# Patient Record
Sex: MALE | Race: BLACK OR AFRICAN AMERICAN | Employment: FULL TIME | URBAN - METROPOLITAN AREA
[De-identification: names, ages, dates, MRNs, and addresses within clinical notes are randomized per-mention and may not be internally consistent; named-entity substitution may affect disease eponyms.]

---

## 2018-01-26 PROBLEM — S76.112A RUPTURE OF LEFT QUADRICEPS MUSCLE, INITIAL ENCOUNTER: Status: ACTIVE | Noted: 2018-01-26

## 2018-03-01 PROBLEM — S76.112D RUPTURE OF LEFT QUADRICEPS MUSCLE, SUBSEQUENT ENCOUNTER: Status: ACTIVE | Noted: 2018-03-01

## 2019-05-29 ENCOUNTER — OFFICE VISIT (OUTPATIENT)
Dept: INTERNAL MEDICINE CLINIC | Facility: CLINIC | Age: 54
End: 2019-05-29
Payer: COMMERCIAL

## 2019-05-29 ENCOUNTER — LAB ENCOUNTER (OUTPATIENT)
Dept: LAB | Age: 54
End: 2019-05-29
Attending: INTERNAL MEDICINE
Payer: COMMERCIAL

## 2019-05-29 VITALS
WEIGHT: 315 LBS | SYSTOLIC BLOOD PRESSURE: 140 MMHG | HEART RATE: 76 BPM | OXYGEN SATURATION: 96 % | TEMPERATURE: 98 F | HEIGHT: 73 IN | BODY MASS INDEX: 41.75 KG/M2 | DIASTOLIC BLOOD PRESSURE: 86 MMHG | RESPIRATION RATE: 16 BRPM

## 2019-05-29 DIAGNOSIS — R03.0 ELEVATED BP WITHOUT DIAGNOSIS OF HYPERTENSION: ICD-10-CM

## 2019-05-29 DIAGNOSIS — Z12.9 CANCER SCREENING: ICD-10-CM

## 2019-05-29 DIAGNOSIS — E66.01 OBESITY, CLASS III, BMI 40-49.9 (MORBID OBESITY) (HCC): ICD-10-CM

## 2019-05-29 DIAGNOSIS — E78.00 PURE HYPERCHOLESTEROLEMIA: ICD-10-CM

## 2019-05-29 DIAGNOSIS — N52.9 ERECTILE DYSFUNCTION, UNSPECIFIED ERECTILE DYSFUNCTION TYPE: ICD-10-CM

## 2019-05-29 DIAGNOSIS — G47.30 SLEEP APNEA, UNSPECIFIED TYPE: ICD-10-CM

## 2019-05-29 DIAGNOSIS — Z00.00 PHYSICAL EXAM: Primary | ICD-10-CM

## 2019-05-29 DIAGNOSIS — Z00.00 PHYSICAL EXAM: ICD-10-CM

## 2019-05-29 PROCEDURE — 80061 LIPID PANEL: CPT

## 2019-05-29 PROCEDURE — 84443 ASSAY THYROID STIM HORMONE: CPT

## 2019-05-29 PROCEDURE — 84403 ASSAY OF TOTAL TESTOSTERONE: CPT

## 2019-05-29 PROCEDURE — 84402 ASSAY OF FREE TESTOSTERONE: CPT

## 2019-05-29 PROCEDURE — 36415 COLL VENOUS BLD VENIPUNCTURE: CPT

## 2019-05-29 PROCEDURE — 85025 COMPLETE CBC W/AUTO DIFF WBC: CPT

## 2019-05-29 PROCEDURE — 84439 ASSAY OF FREE THYROXINE: CPT

## 2019-05-29 PROCEDURE — 83036 HEMOGLOBIN GLYCOSYLATED A1C: CPT

## 2019-05-29 PROCEDURE — 80053 COMPREHEN METABOLIC PANEL: CPT

## 2019-05-29 PROCEDURE — 99396 PREV VISIT EST AGE 40-64: CPT | Performed by: INTERNAL MEDICINE

## 2019-05-29 NOTE — PROGRESS NOTES
HPI:    Patient ID: Chuy Rosado is a 48year old male. In general feels well. Had left quadriceps repair 2/18--has no pain, but still with some mobility issues--was unable to return to usual duties as .   Patient has completed No      Sexual activity: Not on file    Lifestyle      Physical activity:        Days per week: Not on file        Minutes per session: Not on file      Stress: Not on file    Relationships      Social connections:        Talks on phone: Not on file Disp:  Rfl:      Allergies:No Known Allergies   PHYSICAL EXAM:   /86   Pulse 76   Temp 98.4 °F (36.9 °C) (Oral)   Resp 16   Ht 6' 1\" (1.854 m)   Wt (!) 330 lb (149.7 kg)   SpO2 96%   BMI 43.54 kg/m²   Physical Exam   Constitutional: He is oriented t will try to be more compliant with diet and will try to increase his exercise. He is status post quadriceps tendon repair on left--I urged him to continue with home exercise program as prescribed by physical therapy.     His blood pressure is also border

## 2019-06-03 ENCOUNTER — TELEPHONE (OUTPATIENT)
Dept: INTERNAL MEDICINE CLINIC | Facility: CLINIC | Age: 54
End: 2019-06-03

## 2019-06-03 DIAGNOSIS — E34.9 TESTOSTERONE DEFICIENCY: Primary | ICD-10-CM

## 2019-06-03 RX ORDER — TESTOSTERONE 16.2 MG/G
2 GEL TRANSDERMAL DAILY
Qty: 75 G | Refills: 3 | Status: SHIPPED | OUTPATIENT
Start: 2019-06-03 | End: 2019-09-30 | Stop reason: ALTCHOICE

## 2019-06-03 NOTE — TELEPHONE ENCOUNTER
Called patient and relayed DR. VYAS message - verbalized understanding.  Copy of lab results mailed to patients home per request

## 2019-06-03 NOTE — TELEPHONE ENCOUNTER
----- Message from Nelson Mirza MD sent at 6/3/2019  9:26 AM CDT -----  Testosterone level is low  Therefore will need to start testosterone replacement with AndroGel pump  20.25   2 actuations daily  We will need to check free testosterone level again

## 2019-06-03 NOTE — TELEPHONE ENCOUNTER
Rx for Testosterone (Androgel Pump) faxed to [address] [phone] with confirmation received.

## 2019-06-10 ENCOUNTER — TELEPHONE (OUTPATIENT)
Dept: INTERNAL MEDICINE CLINIC | Facility: CLINIC | Age: 54
End: 2019-06-10

## 2019-06-10 NOTE — TELEPHONE ENCOUNTER
[facility] faxed a PA for Testosterone Gel   Fax.  # [phone]   Placed in Purple folder   Routed to Rx

## 2019-06-13 NOTE — TELEPHONE ENCOUNTER
Pls call pt to have him contact insurance company to find out what formulation of testosterone is covered;   Then it would go back to  for change

## 2019-06-20 NOTE — TELEPHONE ENCOUNTER
Smiley's message relayed to pt who verbalized understanding. Pt will call insurance company and call us back.

## 2019-09-16 ENCOUNTER — OFFICE VISIT (OUTPATIENT)
Dept: INTERNAL MEDICINE CLINIC | Facility: CLINIC | Age: 54
End: 2019-09-16
Payer: COMMERCIAL

## 2019-09-16 VITALS
OXYGEN SATURATION: 98 % | BODY MASS INDEX: 48 KG/M2 | SYSTOLIC BLOOD PRESSURE: 158 MMHG | TEMPERATURE: 99 F | DIASTOLIC BLOOD PRESSURE: 78 MMHG | WEIGHT: 315 LBS | HEART RATE: 89 BPM

## 2019-09-16 DIAGNOSIS — E29.1 HYPOGONADISM IN MALE: ICD-10-CM

## 2019-09-16 DIAGNOSIS — E66.1 CLASS 3 DRUG-INDUCED OBESITY WITH SERIOUS COMORBIDITY AND BODY MASS INDEX (BMI) OF 45.0 TO 49.9 IN ADULT (HCC): ICD-10-CM

## 2019-09-16 DIAGNOSIS — G47.30 SLEEP APNEA, UNSPECIFIED TYPE: ICD-10-CM

## 2019-09-16 DIAGNOSIS — M79.89 RIGHT LEG SWELLING: Primary | ICD-10-CM

## 2019-09-16 DIAGNOSIS — R03.0 ELEVATED BP WITHOUT DIAGNOSIS OF HYPERTENSION: ICD-10-CM

## 2019-09-16 PROCEDURE — 99214 OFFICE O/P EST MOD 30 MIN: CPT | Performed by: INTERNAL MEDICINE

## 2019-09-16 RX ORDER — FUROSEMIDE 20 MG/1
20 TABLET ORAL DAILY
Qty: 30 TABLET | Refills: 3 | Status: SHIPPED | OUTPATIENT
Start: 2019-09-16 | End: 2020-01-24

## 2019-09-16 NOTE — PROGRESS NOTES
Kandy Cardenas is a 48year old male. HPI:   Patient presents with:  Swelling: Right foot and calf swelling since about 2 weeks ago. Swelling does seem to fluctuate. On light duty at work, driving more.        47 y/o M with ONUR who has been non-compl palpitations  Respiratory:  Negative for cough, dyspnea and wheezing  Gastrointestinal:  Negative for abdominal pain, constipation, decreased appetite, diarrhea and vomiting; no melena or hematochezia  All other review of systems are negative.         PHYSI

## 2019-09-19 ENCOUNTER — HOSPITAL ENCOUNTER (OUTPATIENT)
Dept: ULTRASOUND IMAGING | Age: 54
Discharge: HOME | End: 2019-09-19
Attending: INTERNAL MEDICINE
Payer: COMMERCIAL

## 2019-09-19 DIAGNOSIS — M79.89 RIGHT LEG SWELLING: ICD-10-CM

## 2019-09-19 PROCEDURE — 93971 EXTREMITY STUDY: CPT | Performed by: INTERNAL MEDICINE

## 2019-09-30 ENCOUNTER — OFFICE VISIT (OUTPATIENT)
Dept: INTERNAL MEDICINE CLINIC | Facility: CLINIC | Age: 54
End: 2019-09-30
Payer: COMMERCIAL

## 2019-09-30 VITALS
HEART RATE: 96 BPM | SYSTOLIC BLOOD PRESSURE: 160 MMHG | OXYGEN SATURATION: 90 % | TEMPERATURE: 98 F | WEIGHT: 315 LBS | DIASTOLIC BLOOD PRESSURE: 70 MMHG | BODY MASS INDEX: 47 KG/M2 | RESPIRATION RATE: 22 BRPM

## 2019-09-30 DIAGNOSIS — G47.30 SLEEP APNEA, UNSPECIFIED TYPE: ICD-10-CM

## 2019-09-30 DIAGNOSIS — M79.89 RIGHT LEG SWELLING: Primary | ICD-10-CM

## 2019-09-30 DIAGNOSIS — I10 BENIGN HYPERTENSION: ICD-10-CM

## 2019-09-30 PROCEDURE — 99214 OFFICE O/P EST MOD 30 MIN: CPT | Performed by: INTERNAL MEDICINE

## 2019-09-30 RX ORDER — LISINOPRIL 10 MG/1
10 TABLET ORAL DAILY
Qty: 30 TABLET | Refills: 5 | Status: SHIPPED | OUTPATIENT
Start: 2019-09-30 | End: 2020-04-15

## 2019-09-30 NOTE — PROGRESS NOTES
Bianca Gimenez is a 48year old male. HPI:   Patient presents with: Follow - Up: 2 week follow up for R leg/foot swelling. States pain and swelling has decreased. Inquiring about US results.        47 y/o M here with c/o BLE edema, ONUR here for F/U; constipation, decreased appetite, diarrhea and vomiting; no melena or hematochezia  All other review of systems are negative. PHYSICAL EXAM:   Blood pressure 160/70, pulse 96, temperature 98.3 °F (36.8 °C), temperature source Oral, resp.  rate 22, we

## 2019-10-21 ENCOUNTER — OFFICE VISIT (OUTPATIENT)
Dept: INTERNAL MEDICINE CLINIC | Facility: CLINIC | Age: 54
End: 2019-10-21
Payer: COMMERCIAL

## 2019-10-21 ENCOUNTER — APPOINTMENT (OUTPATIENT)
Dept: LAB | Age: 54
End: 2019-10-21
Attending: INTERNAL MEDICINE
Payer: COMMERCIAL

## 2019-10-21 VITALS
WEIGHT: 315 LBS | TEMPERATURE: 98 F | HEART RATE: 84 BPM | BODY MASS INDEX: 46 KG/M2 | OXYGEN SATURATION: 98 % | RESPIRATION RATE: 16 BRPM | DIASTOLIC BLOOD PRESSURE: 70 MMHG | SYSTOLIC BLOOD PRESSURE: 136 MMHG

## 2019-10-21 DIAGNOSIS — G47.30 SLEEP APNEA, UNSPECIFIED TYPE: ICD-10-CM

## 2019-10-21 DIAGNOSIS — I10 BENIGN HYPERTENSION: ICD-10-CM

## 2019-10-21 DIAGNOSIS — M79.89 SWELLING OF BOTH LOWER EXTREMITIES: Primary | ICD-10-CM

## 2019-10-21 PROCEDURE — 36415 COLL VENOUS BLD VENIPUNCTURE: CPT

## 2019-10-21 PROCEDURE — 99214 OFFICE O/P EST MOD 30 MIN: CPT | Performed by: INTERNAL MEDICINE

## 2019-10-21 PROCEDURE — 80048 BASIC METABOLIC PNL TOTAL CA: CPT

## 2019-10-21 NOTE — PROGRESS NOTES
Kim Leon is a 48year old male. HPI:   Patient presents with: Follow - Up: 3 week follow up. Pt reports swelling has improved since LOV.        47 y/o M with HTN, BLE edema, ONUR here for F/U; he has been taking Lasix 20 mg po qD with decrease wheezing  Gastrointestinal:  Negative for abdominal pain, constipation, decreased appetite, diarrhea and vomiting; no melena or hematochezia  All other review of systems are negative. PHYSICAL EXAM:   Blood pressure 136/70, pulse 84, temperature 98.

## 2019-12-11 ENCOUNTER — OFFICE VISIT (OUTPATIENT)
Dept: INTERNAL MEDICINE CLINIC | Facility: CLINIC | Age: 54
End: 2019-12-11
Payer: COMMERCIAL

## 2019-12-11 VITALS
SYSTOLIC BLOOD PRESSURE: 134 MMHG | HEART RATE: 68 BPM | BODY MASS INDEX: 41.75 KG/M2 | WEIGHT: 315 LBS | OXYGEN SATURATION: 98 % | HEIGHT: 73 IN | DIASTOLIC BLOOD PRESSURE: 84 MMHG

## 2019-12-11 DIAGNOSIS — E66.01 OBESITY, CLASS III, BMI 40-49.9 (MORBID OBESITY) (HCC): ICD-10-CM

## 2019-12-11 DIAGNOSIS — I10 BENIGN ESSENTIAL HTN: Primary | ICD-10-CM

## 2019-12-11 DIAGNOSIS — G47.33 OBSTRUCTIVE SLEEP APNEA (ADULT) (PEDIATRIC): ICD-10-CM

## 2019-12-11 DIAGNOSIS — S76.112D RUPTURE OF LEFT QUADRICEPS MUSCLE, SUBSEQUENT ENCOUNTER: ICD-10-CM

## 2019-12-11 PROCEDURE — 99213 OFFICE O/P EST LOW 20 MIN: CPT | Performed by: INTERNAL MEDICINE

## 2019-12-12 NOTE — PROGRESS NOTES
HPI:    Patient ID: Roslyn Young is a 48year old male. Patient presents for follow-up. He is not always compliant with his medications and frequently misses 1 or 2 doses of each medication per week.     He does have history of left quadriceps t Lymphadenopathy:     He has no cervical adenopathy. Neurological: He is alert and oriented to person, place, and time. He exhibits normal muscle tone. Psychiatric: He has a normal mood and affect.  Thought content normal.              ASSESSMENT/JASMINA

## 2020-01-24 RX ORDER — FUROSEMIDE 20 MG/1
TABLET ORAL
Qty: 30 TABLET | Refills: 3 | Status: SHIPPED | OUTPATIENT
Start: 2020-01-24 | End: 2020-06-03

## 2020-04-15 RX ORDER — LISINOPRIL 10 MG/1
TABLET ORAL
Qty: 30 TABLET | Refills: 2 | Status: SHIPPED | OUTPATIENT
Start: 2020-04-15 | End: 2020-06-03

## 2020-04-15 NOTE — TELEPHONE ENCOUNTER
Patient is scheduled for follow up with Dr. Que Beaulieu on 5/6/20.     Refill request is for a maintenance medication and has met the criteria specified in the Ambulatory Medication Refill Standing Order for eligibility, visits, laboratory, alerts and was sent to

## 2020-06-03 ENCOUNTER — OFFICE VISIT (OUTPATIENT)
Dept: INTERNAL MEDICINE CLINIC | Facility: CLINIC | Age: 55
End: 2020-06-03
Payer: COMMERCIAL

## 2020-06-03 VITALS
DIASTOLIC BLOOD PRESSURE: 90 MMHG | HEIGHT: 73 IN | OXYGEN SATURATION: 97 % | WEIGHT: 315 LBS | HEART RATE: 84 BPM | BODY MASS INDEX: 41.75 KG/M2 | TEMPERATURE: 98 F | SYSTOLIC BLOOD PRESSURE: 148 MMHG

## 2020-06-03 DIAGNOSIS — Z00.00 PHYSICAL EXAM: ICD-10-CM

## 2020-06-03 DIAGNOSIS — E66.01 OBESITY, CLASS III, BMI 40-49.9 (MORBID OBESITY) (HCC): ICD-10-CM

## 2020-06-03 DIAGNOSIS — R22.2 NODULE OF LEFT ANTERIOR CHEST WALL: ICD-10-CM

## 2020-06-03 DIAGNOSIS — E78.00 PURE HYPERCHOLESTEROLEMIA: ICD-10-CM

## 2020-06-03 DIAGNOSIS — I10 BENIGN ESSENTIAL HTN: Primary | ICD-10-CM

## 2020-06-03 DIAGNOSIS — R79.89 LOW TESTOSTERONE: ICD-10-CM

## 2020-06-03 DIAGNOSIS — G47.33 OBSTRUCTIVE SLEEP APNEA (ADULT) (PEDIATRIC): ICD-10-CM

## 2020-06-03 PROCEDURE — 99396 PREV VISIT EST AGE 40-64: CPT | Performed by: INTERNAL MEDICINE

## 2020-06-03 RX ORDER — FUROSEMIDE 20 MG/1
20 TABLET ORAL DAILY
Qty: 90 TABLET | Refills: 3 | Status: SHIPPED | OUTPATIENT
Start: 2020-06-03 | End: 2020-06-12

## 2020-06-03 RX ORDER — LISINOPRIL 10 MG/1
10 TABLET ORAL DAILY
Qty: 90 TABLET | Refills: 3 | Status: SHIPPED | OUTPATIENT
Start: 2020-06-03 | End: 2020-06-12

## 2020-06-03 NOTE — PROGRESS NOTES
HPI:    Patient ID: Georgiana Menchaca is a 48year old male. Pt did not take his blood pressure meds for the past 2 wks. States that prescription ran out and he has not yet made it back to fill it.     Notes nodule left chest area started around 2 week congestion, ear pain, postnasal drip, sore throat and trouble swallowing. Eyes: Negative for visual disturbance. Respiratory: Negative for cough, chest tightness, shortness of breath and wheezing.     Cardiovascular: Negative for chest pain, palpitatio normal and breath sounds normal. No respiratory distress. He has no wheezes. He has no rales.    There is a very small approximately pea-sized lesion palpated left anterior chest--it appears superficial and not affixed to any underlying tissue--it is not te left anterior chest wall that patient is noticeably noting decreasing in size. He has no symptoms including pain from it. It started after doing extensive upper extremity work. ? Hematoma, ? Cyst, doubt tumor, but will continue to monitor. --If patient

## 2020-06-12 RX ORDER — LISINOPRIL 10 MG/1
10 TABLET ORAL DAILY
Qty: 90 TABLET | Refills: 3 | Status: SHIPPED | OUTPATIENT
Start: 2020-06-12 | End: 2021-09-07

## 2020-06-12 RX ORDER — FUROSEMIDE 20 MG/1
20 TABLET ORAL DAILY
Qty: 90 TABLET | Refills: 3 | Status: SHIPPED | OUTPATIENT
Start: 2020-06-12

## 2020-06-12 NOTE — TELEPHONE ENCOUNTER
Recently refilled to [facility] in [address] [place] [address] states he prefers [facility].  Rx resent    Refill request is for a maintenance medication and has met the criteria specified in the Ambulatory Medication Refill Standing Order for eligibility, visits, laboratory, al

## 2020-07-24 ENCOUNTER — LAB ENCOUNTER (OUTPATIENT)
Dept: LAB | Age: 55
End: 2020-07-24
Attending: INTERNAL MEDICINE
Payer: COMMERCIAL

## 2020-07-24 DIAGNOSIS — R79.89 LOW TESTOSTERONE: ICD-10-CM

## 2020-07-24 DIAGNOSIS — Z00.00 PHYSICAL EXAM: ICD-10-CM

## 2020-07-24 LAB
ALBUMIN SERPL-MCNC: 3.9 G/DL (ref 3.4–5)
ALBUMIN/GLOB SERPL: 1.1 {RATIO} (ref 1–2)
ALP LIVER SERPL-CCNC: 76 U/L (ref 45–117)
ALT SERPL-CCNC: 32 U/L (ref 16–61)
ANION GAP SERPL CALC-SCNC: 7 MMOL/L (ref 0–18)
AST SERPL-CCNC: 18 U/L (ref 15–37)
BASOPHILS # BLD AUTO: 0.03 X10(3) UL (ref 0–0.2)
BASOPHILS NFR BLD AUTO: 0.5 %
BILIRUB SERPL-MCNC: 0.8 MG/DL (ref 0.1–2)
BUN BLD-MCNC: 20 MG/DL (ref 7–18)
BUN/CREAT SERPL: 21.1 (ref 10–20)
CALCIUM BLD-MCNC: 9.1 MG/DL (ref 8.5–10.1)
CHLORIDE SERPL-SCNC: 106 MMOL/L (ref 98–112)
CHOLEST SMN-MCNC: 184 MG/DL (ref ?–200)
CO2 SERPL-SCNC: 27 MMOL/L (ref 21–32)
COMPLEXED PSA SERPL-MCNC: 0.18 NG/ML (ref ?–4)
CREAT BLD-MCNC: 0.95 MG/DL (ref 0.7–1.3)
DEPRECATED RDW RBC AUTO: 37.7 FL (ref 35.1–46.3)
EOSINOPHIL # BLD AUTO: 0.27 X10(3) UL (ref 0–0.7)
EOSINOPHIL NFR BLD AUTO: 4.2 %
ERYTHROCYTE [DISTWIDTH] IN BLOOD BY AUTOMATED COUNT: 11.9 % (ref 11–15)
EST. AVERAGE GLUCOSE BLD GHB EST-MCNC: 103 MG/DL (ref 68–126)
GLOBULIN PLAS-MCNC: 3.6 G/DL (ref 2.8–4.4)
GLUCOSE BLD-MCNC: 87 MG/DL (ref 70–99)
HBA1C MFR BLD HPLC: 5.2 % (ref ?–5.7)
HCT VFR BLD AUTO: 43.2 % (ref 39–53)
HDLC SERPL-MCNC: 43 MG/DL (ref 40–59)
HGB BLD-MCNC: 14 G/DL (ref 13–17.5)
IMM GRANULOCYTES # BLD AUTO: 0.02 X10(3) UL (ref 0–1)
IMM GRANULOCYTES NFR BLD: 0.3 %
LDLC SERPL CALC-MCNC: 126 MG/DL (ref ?–100)
LYMPHOCYTES # BLD AUTO: 2.96 X10(3) UL (ref 1–4)
LYMPHOCYTES NFR BLD AUTO: 46.5 %
M PROTEIN MFR SERPL ELPH: 7.5 G/DL (ref 6.4–8.2)
MCH RBC QN AUTO: 28.2 PG (ref 26–34)
MCHC RBC AUTO-ENTMCNC: 32.4 G/DL (ref 31–37)
MCV RBC AUTO: 87.1 FL (ref 80–100)
MONOCYTES # BLD AUTO: 0.47 X10(3) UL (ref 0.1–1)
MONOCYTES NFR BLD AUTO: 7.4 %
NEUTROPHILS # BLD AUTO: 2.62 X10 (3) UL (ref 1.5–7.7)
NEUTROPHILS # BLD AUTO: 2.62 X10(3) UL (ref 1.5–7.7)
NEUTROPHILS NFR BLD AUTO: 41.1 %
NONHDLC SERPL-MCNC: 141 MG/DL (ref ?–130)
OSMOLALITY SERPL CALC.SUM OF ELEC: 292 MOSM/KG (ref 275–295)
PATIENT FASTING Y/N/NP: YES
PATIENT FASTING Y/N/NP: YES
PLATELET # BLD AUTO: 294 10(3)UL (ref 150–450)
POTASSIUM SERPL-SCNC: 4.1 MMOL/L (ref 3.5–5.1)
RBC # BLD AUTO: 4.96 X10(6)UL (ref 4.3–5.7)
SODIUM SERPL-SCNC: 140 MMOL/L (ref 136–145)
TRIGL SERPL-MCNC: 77 MG/DL (ref 30–149)
TSI SER-ACNC: 3.32 MIU/ML (ref 0.36–3.74)
VLDLC SERPL CALC-MCNC: 15 MG/DL (ref 0–30)
WBC # BLD AUTO: 6.4 X10(3) UL (ref 4–11)

## 2020-07-24 PROCEDURE — 83036 HEMOGLOBIN GLYCOSYLATED A1C: CPT

## 2020-07-24 PROCEDURE — 85025 COMPLETE CBC W/AUTO DIFF WBC: CPT

## 2020-07-24 PROCEDURE — 80061 LIPID PANEL: CPT

## 2020-07-24 PROCEDURE — 84403 ASSAY OF TOTAL TESTOSTERONE: CPT

## 2020-07-24 PROCEDURE — 80053 COMPREHEN METABOLIC PANEL: CPT

## 2020-07-24 PROCEDURE — 84402 ASSAY OF FREE TESTOSTERONE: CPT

## 2020-07-24 PROCEDURE — 84443 ASSAY THYROID STIM HORMONE: CPT

## 2020-07-24 PROCEDURE — 36415 COLL VENOUS BLD VENIPUNCTURE: CPT

## 2020-07-27 LAB
TESTOSTERONE, FREE, S: 4.69 NG/DL
TESTOSTERONE, TOTAL, S: 213 NG/DL

## 2020-07-28 ENCOUNTER — TELEPHONE (OUTPATIENT)
Dept: INTERNAL MEDICINE CLINIC | Facility: CLINIC | Age: 55
End: 2020-07-28

## 2020-07-28 NOTE — TELEPHONE ENCOUNTER
Call transferred from Crystal Johnson at [facility]. Spoke to patient and relayed MD message, patient verbalized understanding.

## 2020-07-28 NOTE — TELEPHONE ENCOUNTER
----- Message from Kavin Barton MD sent at 7/26/2020  7:39 AM CDT -----  Hemoglobin A1c is normal at 5.2--therefore there is no evidence for diabetes. Cholesterol is 184 with LDL of 126--this is at goal LDL of less than 130.   CMP, including electrolyt

## 2020-07-28 NOTE — TELEPHONE ENCOUNTER
----- Message from Maryanne Holman MD sent at 7/28/2020  8:48 AM CDT -----  Free testosterone level is normal.  Therefore no testosterone supplementation is needed at this time.

## 2021-03-21 RX ORDER — FUROSEMIDE 20 MG/1
TABLET ORAL
Qty: 90 TABLET | Refills: 3 | OUTPATIENT
Start: 2021-03-21

## 2021-03-21 RX ORDER — LISINOPRIL 10 MG/1
TABLET ORAL
Qty: 90 TABLET | Refills: 3 | OUTPATIENT
Start: 2021-03-21

## 2021-04-05 RX ORDER — LISINOPRIL 10 MG/1
TABLET ORAL
Qty: 90 TABLET | Refills: 3 | OUTPATIENT
Start: 2021-04-05

## 2021-04-05 RX ORDER — FUROSEMIDE 20 MG/1
TABLET ORAL
Qty: 90 TABLET | Refills: 3 | OUTPATIENT
Start: 2021-04-05

## 2021-09-07 ENCOUNTER — LAB ENCOUNTER (OUTPATIENT)
Dept: LAB | Age: 56
End: 2021-09-07
Attending: INTERNAL MEDICINE
Payer: COMMERCIAL

## 2021-09-07 ENCOUNTER — OFFICE VISIT (OUTPATIENT)
Dept: INTERNAL MEDICINE CLINIC | Facility: CLINIC | Age: 56
End: 2021-09-07
Payer: COMMERCIAL

## 2021-09-07 VITALS
OXYGEN SATURATION: 94 % | HEIGHT: 72.5 IN | TEMPERATURE: 99 F | DIASTOLIC BLOOD PRESSURE: 90 MMHG | SYSTOLIC BLOOD PRESSURE: 140 MMHG | BODY MASS INDEX: 42.2 KG/M2 | WEIGHT: 315 LBS | HEART RATE: 75 BPM

## 2021-09-07 DIAGNOSIS — Z00.00 PHYSICAL EXAM: Primary | ICD-10-CM

## 2021-09-07 DIAGNOSIS — R79.89 LOW TESTOSTERONE: ICD-10-CM

## 2021-09-07 DIAGNOSIS — I10 BENIGN ESSENTIAL HTN: ICD-10-CM

## 2021-09-07 DIAGNOSIS — Z00.00 PHYSICAL EXAM: ICD-10-CM

## 2021-09-07 DIAGNOSIS — L60.0 INGROWN TOENAIL OF LEFT FOOT: ICD-10-CM

## 2021-09-07 DIAGNOSIS — E66.01 OBESITY, CLASS III, BMI 40-49.9 (MORBID OBESITY) (HCC): ICD-10-CM

## 2021-09-07 DIAGNOSIS — E78.00 PURE HYPERCHOLESTEROLEMIA: ICD-10-CM

## 2021-09-07 DIAGNOSIS — G47.30 SLEEP APNEA, UNSPECIFIED TYPE: ICD-10-CM

## 2021-09-07 LAB
ALBUMIN SERPL-MCNC: 4.1 G/DL (ref 3.4–5)
ALBUMIN/GLOB SERPL: 1.2 {RATIO} (ref 1–2)
ALP LIVER SERPL-CCNC: 70 U/L
ALT SERPL-CCNC: 33 U/L
ANION GAP SERPL CALC-SCNC: 10 MMOL/L (ref 0–18)
AST SERPL-CCNC: 17 U/L (ref 15–37)
BASOPHILS # BLD AUTO: 0.03 X10(3) UL (ref 0–0.2)
BASOPHILS NFR BLD AUTO: 0.5 %
BILIRUB SERPL-MCNC: 1.3 MG/DL (ref 0.1–2)
BUN BLD-MCNC: 17 MG/DL (ref 7–18)
BUN/CREAT SERPL: 19.8 (ref 10–20)
CALCIUM BLD-MCNC: 9.6 MG/DL (ref 8.5–10.1)
CHLORIDE SERPL-SCNC: 106 MMOL/L (ref 98–112)
CHOLEST SMN-MCNC: 213 MG/DL (ref ?–200)
CO2 SERPL-SCNC: 24 MMOL/L (ref 21–32)
COMPLEXED PSA SERPL-MCNC: 0.26 NG/ML (ref ?–4)
CREAT BLD-MCNC: 0.86 MG/DL
DEPRECATED RDW RBC AUTO: 37.8 FL (ref 35.1–46.3)
EOSINOPHIL # BLD AUTO: 0.15 X10(3) UL (ref 0–0.7)
EOSINOPHIL NFR BLD AUTO: 2.5 %
ERYTHROCYTE [DISTWIDTH] IN BLOOD BY AUTOMATED COUNT: 11.9 % (ref 11–15)
EST. AVERAGE GLUCOSE BLD GHB EST-MCNC: 97 MG/DL (ref 68–126)
GLOBULIN PLAS-MCNC: 3.4 G/DL (ref 2.8–4.4)
GLUCOSE BLD-MCNC: 81 MG/DL (ref 70–99)
HBA1C MFR BLD HPLC: 5 % (ref ?–5.7)
HCT VFR BLD AUTO: 44.1 %
HDLC SERPL-MCNC: 47 MG/DL (ref 40–59)
HGB BLD-MCNC: 14.3 G/DL
IMM GRANULOCYTES # BLD AUTO: 0.01 X10(3) UL (ref 0–1)
IMM GRANULOCYTES NFR BLD: 0.2 %
LDLC SERPL CALC-MCNC: 150 MG/DL (ref ?–100)
LYMPHOCYTES # BLD AUTO: 2.48 X10(3) UL (ref 1–4)
LYMPHOCYTES NFR BLD AUTO: 41.2 %
M PROTEIN MFR SERPL ELPH: 7.5 G/DL (ref 6.4–8.2)
MCH RBC QN AUTO: 28 PG (ref 26–34)
MCHC RBC AUTO-ENTMCNC: 32.4 G/DL (ref 31–37)
MCV RBC AUTO: 86.5 FL
MONOCYTES # BLD AUTO: 0.42 X10(3) UL (ref 0.1–1)
MONOCYTES NFR BLD AUTO: 7 %
NEUTROPHILS # BLD AUTO: 2.93 X10 (3) UL (ref 1.5–7.7)
NEUTROPHILS # BLD AUTO: 2.93 X10(3) UL (ref 1.5–7.7)
NEUTROPHILS NFR BLD AUTO: 48.6 %
NONHDLC SERPL-MCNC: 166 MG/DL (ref ?–130)
OSMOLALITY SERPL CALC.SUM OF ELEC: 291 MOSM/KG (ref 275–295)
PATIENT FASTING Y/N/NP: YES
PATIENT FASTING Y/N/NP: YES
PLATELET # BLD AUTO: 291 10(3)UL (ref 150–450)
POTASSIUM SERPL-SCNC: 3.7 MMOL/L (ref 3.5–5.1)
RBC # BLD AUTO: 5.1 X10(6)UL
SODIUM SERPL-SCNC: 140 MMOL/L (ref 136–145)
TRIGL SERPL-MCNC: 91 MG/DL (ref 30–149)
VLDLC SERPL CALC-MCNC: 17 MG/DL (ref 0–30)
WBC # BLD AUTO: 6 X10(3) UL (ref 4–11)

## 2021-09-07 PROCEDURE — 3008F BODY MASS INDEX DOCD: CPT | Performed by: INTERNAL MEDICINE

## 2021-09-07 PROCEDURE — 36415 COLL VENOUS BLD VENIPUNCTURE: CPT

## 2021-09-07 PROCEDURE — 99396 PREV VISIT EST AGE 40-64: CPT | Performed by: INTERNAL MEDICINE

## 2021-09-07 PROCEDURE — 84402 ASSAY OF FREE TESTOSTERONE: CPT

## 2021-09-07 PROCEDURE — 83036 HEMOGLOBIN GLYCOSYLATED A1C: CPT

## 2021-09-07 PROCEDURE — 80061 LIPID PANEL: CPT

## 2021-09-07 PROCEDURE — 85025 COMPLETE CBC W/AUTO DIFF WBC: CPT

## 2021-09-07 PROCEDURE — 3080F DIAST BP >= 90 MM HG: CPT | Performed by: INTERNAL MEDICINE

## 2021-09-07 PROCEDURE — 3077F SYST BP >= 140 MM HG: CPT | Performed by: INTERNAL MEDICINE

## 2021-09-07 PROCEDURE — 84403 ASSAY OF TOTAL TESTOSTERONE: CPT

## 2021-09-07 PROCEDURE — 80053 COMPREHEN METABOLIC PANEL: CPT

## 2021-09-07 RX ORDER — LISINOPRIL 10 MG/1
10 TABLET ORAL DAILY
Qty: 90 TABLET | Refills: 3 | Status: SHIPPED | OUTPATIENT
Start: 2021-09-07

## 2021-09-07 RX ORDER — FUROSEMIDE 20 MG/1
20 TABLET ORAL DAILY
Qty: 90 TABLET | Refills: 3 | Status: CANCELLED | OUTPATIENT
Start: 2021-09-07

## 2021-09-07 NOTE — PROGRESS NOTES
HPI:    Patient ID: Clarita Neumann is a 49year old male. Presents for physical exam    Patient has not taken his lisinopril or furosemide for over 2 months.     He states that he has been trying to work on weight loss by exercising more with a perso stool, diarrhea and vomiting. Endocrine: Negative for cold intolerance and heat intolerance. Genitourinary: Negative for dysuria, hematuria, penile swelling and scrotal swelling. Musculoskeletal: Negative for back pain.    Allergic/Immunologic: Alexander Crawford Normal breath sounds. No wheezing or rales. Abdominal:      General: There is no distension. Palpations: Abdomen is soft. There is no mass. Tenderness: There is no abdominal tenderness. There is no right CVA tenderness or left CVA tenderness. profile today. Patient also has history of low testosterone. He has not been on testosterone supplementation because his insurance never approved testosterone replacement.     Patient has left ingrown toenail--we will refer to podiatry for further manag

## 2021-09-10 ENCOUNTER — TELEPHONE (OUTPATIENT)
Dept: INTERNAL MEDICINE CLINIC | Facility: CLINIC | Age: 56
End: 2021-09-10

## 2021-09-10 DIAGNOSIS — E78.5 HYPERLIPIDEMIA, UNSPECIFIED HYPERLIPIDEMIA TYPE: Primary | ICD-10-CM

## 2021-09-10 RX ORDER — ATORVASTATIN CALCIUM 10 MG/1
10 TABLET, FILM COATED ORAL NIGHTLY
Qty: 90 TABLET | Refills: 3 | Status: SHIPPED | OUTPATIENT
Start: 2021-09-10

## 2021-09-10 NOTE — TELEPHONE ENCOUNTER
Spoke to patient and relayed MD message. Reviewed foods to avoid in order to  improve nutritional intake. Pt verbalized understanding and agrees with plan.

## 2021-09-10 NOTE — TELEPHONE ENCOUNTER
----- Message from Yonatan Miller MD sent at 9/10/2021  8:01 AM CDT -----  General chemistries including glucose, electrolytes, liver function test and kidney function are all normal.    Cholesterol is 213 with LDL of 150--this is too high as goal LDL is

## 2021-09-12 LAB
TESTOSTERONE, FREE, S: 8.56 NG/DL
TESTOSTERONE, TOTAL, S: 317 NG/DL

## 2021-09-13 ENCOUNTER — TELEPHONE (OUTPATIENT)
Dept: INTERNAL MEDICINE CLINIC | Facility: CLINIC | Age: 56
End: 2021-09-13

## 2021-09-13 NOTE — TELEPHONE ENCOUNTER
Lucas Smart MD   9/13/2021  8:47 AM CDT Back to Top        Free testosterone level is normal.  Therefore no testosterone supplementation is needed.     Lucas Smart MD   9/10/2021  8:01 AM CDT         General chemistries including glucose, electro

## 2021-10-19 NOTE — H&P
[address] [facility] [address] Gastroenterology                                                                                                  Clinic History and Physical     Pa Jose - Quad Repair      Family Hx:   Family History   Problem Relation Age of Onset   • High Blood Pressure Mother    • High Blood Pressure Father       Social History: Social History    Tobacco Use      Smoking status: Never Smoker      Smokeless tobac regular rate and rhythm, the extremities are warm and well perfused   Lung: effort normal and breath sounds normal, no respiratory distress, wheezes or rales  GI: soft, non-tender exam in all quadrants without rigidity or guarding, non-distended, no abnorm loss medications (phentermine/Vyvanse/Adderall)  x 7 days prior to the procedure(s)    ** If MAC @ [facility] or IV twilight - continue all medications as prescribed    ** COVID-19 testing required 72 hours prior to procedure      Colonoscopy consent: I have disc

## 2021-10-28 ENCOUNTER — OFFICE VISIT (OUTPATIENT)
Dept: GASTROENTEROLOGY | Facility: CLINIC | Age: 56
End: 2021-10-28
Payer: COMMERCIAL

## 2021-10-28 ENCOUNTER — TELEPHONE (OUTPATIENT)
Dept: GASTROENTEROLOGY | Facility: CLINIC | Age: 56
End: 2021-10-28

## 2021-10-28 VITALS
DIASTOLIC BLOOD PRESSURE: 80 MMHG | HEART RATE: 71 BPM | BODY MASS INDEX: 42.66 KG/M2 | WEIGHT: 315 LBS | HEIGHT: 72 IN | SYSTOLIC BLOOD PRESSURE: 140 MMHG

## 2021-10-28 DIAGNOSIS — Z12.11 COLON CANCER SCREENING: Primary | ICD-10-CM

## 2021-10-28 DIAGNOSIS — Z12.11 SCREENING FOR COLON CANCER: Primary | ICD-10-CM

## 2021-10-28 PROCEDURE — 99243 OFF/OP CNSLTJ NEW/EST LOW 30: CPT | Performed by: NURSE PRACTITIONER

## 2021-10-28 PROCEDURE — 3077F SYST BP >= 140 MM HG: CPT | Performed by: NURSE PRACTITIONER

## 2021-10-28 PROCEDURE — 3079F DIAST BP 80-89 MM HG: CPT | Performed by: NURSE PRACTITIONER

## 2021-10-28 PROCEDURE — 3008F BODY MASS INDEX DOCD: CPT | Performed by: NURSE PRACTITIONER

## 2021-10-28 RX ORDER — POLYETHYLENE GLYCOL 3350, SODIUM CHLORIDE, SODIUM BICARBONATE, POTASSIUM CHLORIDE 420; 11.2; 5.72; 1.48 G/4L; G/4L; G/4L; G/4L
POWDER, FOR SOLUTION ORAL
Qty: 4000 ML | Refills: 0 | Status: SHIPPED | OUTPATIENT
Start: 2021-10-28

## 2021-10-28 NOTE — PATIENT INSTRUCTIONS
-Schedule colonoscopy w/ Dr. Lisa Junior or Dr. Elle Mortensen with MAC due to medical history  Dx: screening   -Eligible for NE: No r/t medical history  -Prep: Split dose Colyte/TriLyte or equivalent  -Anti-platelets and anti-coagulants: None  -Diabetes meds: Non

## 2021-10-28 NOTE — TELEPHONE ENCOUNTER
Scheduled for:  Colonoscopy 15647  Provider Name:  Dr Shaun Ibarra  Date:  03/01/2022  Location:  [facility]  Sedation:  MAC  Time: 10:00am (pt is aware to arrive at 0900)      Prep:  colyte  Meds/Allergies Reconciled?:  Audrey/APN reviewed.   Diagnosis with codes

## 2022-02-23 ENCOUNTER — TELEPHONE (OUTPATIENT)
Dept: GASTROENTEROLOGY | Facility: CLINIC | Age: 57
End: 2022-02-23

## 2022-05-23 ENCOUNTER — MED REC SCAN ONLY (OUTPATIENT)
Dept: INTERNAL MEDICINE CLINIC | Facility: CLINIC | Age: 57
End: 2022-05-23

## 2022-06-16 ENCOUNTER — MED REC SCAN ONLY (OUTPATIENT)
Dept: INTERNAL MEDICINE CLINIC | Facility: CLINIC | Age: 57
End: 2022-06-16

## 2022-06-20 ENCOUNTER — TELEPHONE (OUTPATIENT)
Dept: INTERNAL MEDICINE CLINIC | Facility: CLINIC | Age: 57
End: 2022-06-20

## 2022-06-20 NOTE — TELEPHONE ENCOUNTER
Dr. Valencia Johns reviewed and signed orders from [place] [address] physical therapy. Faxed back to [phone]. Fax confirmation received. Copy to scanning.

## 2022-06-21 ENCOUNTER — TELEPHONE (OUTPATIENT)
Dept: INTERNAL MEDICINE CLINIC | Facility: CLINIC | Age: 57
End: 2022-06-21

## 2022-06-21 NOTE — TELEPHONE ENCOUNTER
Called patient who was not seen since September 2021 - transferred to [facility] to schedule cliff with  regarding note he needs for work

## 2022-06-21 NOTE — TELEPHONE ENCOUNTER
Requests call back to advise on note needed for employer to go over work restrictions due to knee injury      [phone]

## 2022-06-23 ENCOUNTER — LAB ENCOUNTER (OUTPATIENT)
Dept: LAB | Age: 57
End: 2022-06-23
Attending: INTERNAL MEDICINE
Payer: COMMERCIAL

## 2022-06-23 ENCOUNTER — OFFICE VISIT (OUTPATIENT)
Dept: INTERNAL MEDICINE CLINIC | Facility: CLINIC | Age: 57
End: 2022-06-23
Payer: COMMERCIAL

## 2022-06-23 VITALS
DIASTOLIC BLOOD PRESSURE: 70 MMHG | OXYGEN SATURATION: 97 % | BODY MASS INDEX: 42.66 KG/M2 | HEIGHT: 72 IN | RESPIRATION RATE: 16 BRPM | HEART RATE: 78 BPM | TEMPERATURE: 97 F | SYSTOLIC BLOOD PRESSURE: 136 MMHG | WEIGHT: 315 LBS

## 2022-06-23 DIAGNOSIS — I10 BENIGN ESSENTIAL HTN: Primary | ICD-10-CM

## 2022-06-23 DIAGNOSIS — E78.00 PURE HYPERCHOLESTEROLEMIA: ICD-10-CM

## 2022-06-23 DIAGNOSIS — E78.5 HYPERLIPIDEMIA, UNSPECIFIED HYPERLIPIDEMIA TYPE: ICD-10-CM

## 2022-06-23 DIAGNOSIS — G47.30 SLEEP APNEA, UNSPECIFIED TYPE: ICD-10-CM

## 2022-06-23 DIAGNOSIS — M62.89 QUADRICEP TIGHTNESS: ICD-10-CM

## 2022-06-23 LAB
ALBUMIN SERPL-MCNC: 3.7 G/DL (ref 3.4–5)
ALP LIVER SERPL-CCNC: 66 U/L
ALT SERPL-CCNC: 34 U/L
AST SERPL-CCNC: 21 U/L (ref 15–37)
BILIRUB DIRECT SERPL-MCNC: 0.2 MG/DL (ref 0–0.2)
BILIRUB SERPL-MCNC: 0.9 MG/DL (ref 0.1–2)
CHOLEST SERPL-MCNC: 150 MG/DL (ref ?–200)
FASTING PATIENT LIPID ANSWER: NO
HDLC SERPL-MCNC: 47 MG/DL (ref 40–59)
LDLC SERPL CALC-MCNC: 89 MG/DL (ref ?–100)
NONHDLC SERPL-MCNC: 103 MG/DL (ref ?–130)
PROT SERPL-MCNC: 7.1 G/DL (ref 6.4–8.2)
TRIGL SERPL-MCNC: 69 MG/DL (ref 30–149)
VLDLC SERPL CALC-MCNC: 11 MG/DL (ref 0–30)

## 2022-06-23 PROCEDURE — 3008F BODY MASS INDEX DOCD: CPT | Performed by: INTERNAL MEDICINE

## 2022-06-23 PROCEDURE — 36415 COLL VENOUS BLD VENIPUNCTURE: CPT

## 2022-06-23 PROCEDURE — 3075F SYST BP GE 130 - 139MM HG: CPT | Performed by: INTERNAL MEDICINE

## 2022-06-23 PROCEDURE — 80061 LIPID PANEL: CPT

## 2022-06-23 PROCEDURE — 80076 HEPATIC FUNCTION PANEL: CPT

## 2022-06-23 PROCEDURE — 3078F DIAST BP <80 MM HG: CPT | Performed by: INTERNAL MEDICINE

## 2022-06-23 PROCEDURE — 99213 OFFICE O/P EST LOW 20 MIN: CPT | Performed by: INTERNAL MEDICINE

## 2022-06-24 ENCOUNTER — TELEPHONE (OUTPATIENT)
Dept: INTERNAL MEDICINE CLINIC | Facility: CLINIC | Age: 57
End: 2022-06-24

## 2022-06-24 NOTE — TELEPHONE ENCOUNTER
----- Message from Ori Gil MD sent at 6/24/2022  7:47 AM CDT -----  Lab results reviewed. Cholesterol is much better at 150 with LDL of 89--this is now at goal LDL of less than 130. Liver function tests are normal.    Therefore continue same atorvastatin.

## 2022-07-18 ENCOUNTER — TELEPHONE (OUTPATIENT)
Dept: GASTROENTEROLOGY | Facility: CLINIC | Age: 57
End: 2022-07-18

## 2022-07-18 RX ORDER — POLYETHYLENE GLYCOL 3350, SODIUM CHLORIDE, SODIUM BICARBONATE, POTASSIUM CHLORIDE 420; 11.2; 5.72; 1.48 G/4L; G/4L; G/4L; G/4L
POWDER, FOR SOLUTION ORAL
Qty: 4000 ML | Refills: 0 | Status: SHIPPED | OUTPATIENT
Start: 2022-07-18

## 2022-07-18 NOTE — TELEPHONE ENCOUNTER
PEG prep was sent to patient's pharmacy on 10/28/21. Patient just needed to call pharmacy and ask them to fill it. I called the pharmacy myself to request they do so, but was placed on an extended hold. Given the timeframe (procedure tomorrow and patient needs to start prep) I re-sent the script to patient's pharmacy at this time. Left detailed VM for patient informing him to  rx ASAP after touching base w/ pharmacy, as the script was (re)sent & should be ready shortly.

## 2022-07-19 ENCOUNTER — TELEPHONE (OUTPATIENT)
Dept: GASTROENTEROLOGY | Facility: CLINIC | Age: 57
End: 2022-07-19

## 2022-07-19 DIAGNOSIS — Z12.11 SCREEN FOR COLON CANCER: Primary | ICD-10-CM

## 2022-07-20 ENCOUNTER — OFFICE VISIT (OUTPATIENT)
Dept: INTERNAL MEDICINE CLINIC | Facility: CLINIC | Age: 57
End: 2022-07-20
Payer: COMMERCIAL

## 2022-07-20 VITALS
HEART RATE: 70 BPM | TEMPERATURE: 98 F | OXYGEN SATURATION: 96 % | HEIGHT: 72 IN | SYSTOLIC BLOOD PRESSURE: 142 MMHG | WEIGHT: 315 LBS | BODY MASS INDEX: 42.66 KG/M2 | DIASTOLIC BLOOD PRESSURE: 86 MMHG

## 2022-07-20 DIAGNOSIS — I10 BENIGN ESSENTIAL HTN: ICD-10-CM

## 2022-07-20 DIAGNOSIS — S76.112D RUPTURE OF LEFT QUADRICEPS TENDON, SUBSEQUENT ENCOUNTER: ICD-10-CM

## 2022-07-20 DIAGNOSIS — G47.30 SLEEP APNEA, UNSPECIFIED TYPE: ICD-10-CM

## 2022-07-20 DIAGNOSIS — E78.00 PURE HYPERCHOLESTEROLEMIA: ICD-10-CM

## 2022-07-20 DIAGNOSIS — L90.5 SCAR IRRITATION: Primary | ICD-10-CM

## 2022-07-20 PROCEDURE — 3079F DIAST BP 80-89 MM HG: CPT | Performed by: INTERNAL MEDICINE

## 2022-07-20 PROCEDURE — 3008F BODY MASS INDEX DOCD: CPT | Performed by: INTERNAL MEDICINE

## 2022-07-20 PROCEDURE — 99213 OFFICE O/P EST LOW 20 MIN: CPT | Performed by: INTERNAL MEDICINE

## 2022-07-20 PROCEDURE — 3077F SYST BP >= 140 MM HG: CPT | Performed by: INTERNAL MEDICINE

## 2022-07-21 NOTE — TELEPHONE ENCOUNTER
Scheduled for:  Colonoscopy 82800  Provider Name:  Dr Gris Sena  Date:  01/04/2022  Location:   [facility]  Sedation:  MAC  Time:  0632 (pt is aware to arrive at 0845)  Prep: Colyte    Meds/Allergies Reconciled?:  Physician reviewed  Diagnosis with codes:  CCS Z12.11  Was patient informed to call insurance with codes (Y/N):  Y     Referral sent?:  NA  [facility] or [address] notified?:  I sent an electronic request to Endo Scheduling and received a confirmation today. Medication Orders:  Pt is aware to NOT take iron pills, herbal meds and diet supplements for 7 days before exam. Also to NOT take any form of alcohol, recreational drugs and any forms of ED meds 24 hours before exam. Hold lisinopril morning of procedure. Misc Orders:       Further instructions given by staff: I discussed the prep instructions with the patient which HE verbally understood and is aware that I will send the instructions today via [employer].  Patient was informed about Covid testing prior procedure

## 2022-08-24 ENCOUNTER — TELEPHONE (OUTPATIENT)
Dept: INTERNAL MEDICINE CLINIC | Facility: CLINIC | Age: 57
End: 2022-08-24

## 2022-08-24 NOTE — TELEPHONE ENCOUNTER
Spoke to patient, pt requests and agrees for us to fax OV notes from 6/23/22 and 7/20/22, where PT was advised to be continued. Patient confirms recipient is Jose Freeman @ Fax # [phone]. Notes faxed, Confirmation received.

## 2022-08-24 NOTE — TELEPHONE ENCOUNTER
Patient is calling he said his work is asking for the last 3 OV Notes when patient saw Dr Shirley Figueroa that is pertaining to his knee issue.     Please fax Attn:  Juan Browning  Fax #[phone]

## 2022-09-07 ENCOUNTER — TELEPHONE (OUTPATIENT)
Dept: INTERNAL MEDICINE CLINIC | Facility: CLINIC | Age: 57
End: 2022-09-07

## 2022-09-07 NOTE — TELEPHONE ENCOUNTER
Pt called  Jerald Overall requesting appointment notes and dates of appointments patient was seen for knee injury, beginning 7/22  Please fax to:  FAX#:  Attn:  Sabrina Paul  [phone]  Case#:  [id number]  Please call patient for addt'l information  Tasked to nursing

## 2022-09-13 NOTE — TELEPHONE ENCOUNTER
Faxed 7/20/2022 office visit to below information. See TTE from 8/24/2022, Dr. Jon Hoffman and patient approved fax.

## 2022-09-27 ENCOUNTER — TELEPHONE (OUTPATIENT)
Dept: INTERNAL MEDICINE CLINIC | Facility: CLINIC | Age: 57
End: 2022-09-27

## 2022-10-03 ENCOUNTER — TELEPHONE (OUTPATIENT)
Dept: INTERNAL MEDICINE CLINIC | Facility: CLINIC | Age: 57
End: 2022-10-03

## 2022-10-03 NOTE — TELEPHONE ENCOUNTER
Received a fax from [address] [place] [address] with patient's [place] paperwork for Dr Regina Ansari to fill out. Patient is scheduled to see Dr Regina Ansari on 10/11/2022 for his annual physical and FMLA paperwork completion. Forms placed in Dr Cesario Powell with barcode.

## 2022-10-11 ENCOUNTER — LAB ENCOUNTER (OUTPATIENT)
Dept: LAB | Age: 57
End: 2022-10-11
Attending: INTERNAL MEDICINE
Payer: COMMERCIAL

## 2022-10-11 ENCOUNTER — OFFICE VISIT (OUTPATIENT)
Dept: INTERNAL MEDICINE CLINIC | Facility: CLINIC | Age: 57
End: 2022-10-11
Payer: COMMERCIAL

## 2022-10-11 VITALS
TEMPERATURE: 98 F | HEIGHT: 72 IN | SYSTOLIC BLOOD PRESSURE: 134 MMHG | HEART RATE: 75 BPM | WEIGHT: 315 LBS | OXYGEN SATURATION: 95 % | DIASTOLIC BLOOD PRESSURE: 76 MMHG | BODY MASS INDEX: 42.66 KG/M2

## 2022-10-11 DIAGNOSIS — R79.89 LOW TESTOSTERONE: ICD-10-CM

## 2022-10-11 DIAGNOSIS — Z00.00 PHYSICAL EXAM: Primary | ICD-10-CM

## 2022-10-11 DIAGNOSIS — E78.00 PURE HYPERCHOLESTEROLEMIA: ICD-10-CM

## 2022-10-11 DIAGNOSIS — E66.01 OBESITY, CLASS III, BMI 40-49.9 (MORBID OBESITY) (HCC): ICD-10-CM

## 2022-10-11 DIAGNOSIS — G47.33 OBSTRUCTIVE SLEEP APNEA (ADULT) (PEDIATRIC): ICD-10-CM

## 2022-10-11 DIAGNOSIS — Z00.00 PHYSICAL EXAM: ICD-10-CM

## 2022-10-11 DIAGNOSIS — I10 BENIGN ESSENTIAL HTN: ICD-10-CM

## 2022-10-11 DIAGNOSIS — S76.112D RUPTURE OF LEFT QUADRICEPS MUSCLE, SUBSEQUENT ENCOUNTER: ICD-10-CM

## 2022-10-11 DIAGNOSIS — Z23 NEED FOR VACCINATION: ICD-10-CM

## 2022-10-11 DIAGNOSIS — M25.562 LEFT KNEE PAIN, UNSPECIFIED CHRONICITY: ICD-10-CM

## 2022-10-11 LAB
ALBUMIN SERPL-MCNC: 3.8 G/DL (ref 3.4–5)
ALBUMIN/GLOB SERPL: 1.1 {RATIO} (ref 1–2)
ALP LIVER SERPL-CCNC: 71 U/L
ALT SERPL-CCNC: 33 U/L
ANION GAP SERPL CALC-SCNC: 7 MMOL/L (ref 0–18)
AST SERPL-CCNC: 21 U/L (ref 15–37)
BASOPHILS # BLD AUTO: 0.02 X10(3) UL (ref 0–0.2)
BASOPHILS NFR BLD AUTO: 0.3 %
BILIRUB SERPL-MCNC: 1 MG/DL (ref 0.1–2)
BUN BLD-MCNC: 14 MG/DL (ref 7–18)
BUN/CREAT SERPL: 15.7 (ref 10–20)
CALCIUM BLD-MCNC: 8.7 MG/DL (ref 8.5–10.1)
CHLORIDE SERPL-SCNC: 107 MMOL/L (ref 98–112)
CHOLEST SERPL-MCNC: 158 MG/DL (ref ?–200)
CO2 SERPL-SCNC: 25 MMOL/L (ref 21–32)
COMPLEXED PSA SERPL-MCNC: 0.2 NG/ML (ref ?–4)
CREAT BLD-MCNC: 0.89 MG/DL
DEPRECATED RDW RBC AUTO: 37.4 FL (ref 35.1–46.3)
EOSINOPHIL # BLD AUTO: 0.18 X10(3) UL (ref 0–0.7)
EOSINOPHIL NFR BLD AUTO: 2.8 %
ERYTHROCYTE [DISTWIDTH] IN BLOOD BY AUTOMATED COUNT: 11.7 % (ref 11–15)
EST. AVERAGE GLUCOSE BLD GHB EST-MCNC: 97 MG/DL (ref 68–126)
FASTING PATIENT LIPID ANSWER: NO
FASTING STATUS PATIENT QL REPORTED: NO
GFR SERPLBLD BASED ON 1.73 SQ M-ARVRAT: 101 ML/MIN/1.73M2 (ref 60–?)
GLOBULIN PLAS-MCNC: 3.5 G/DL (ref 2.8–4.4)
GLUCOSE BLD-MCNC: 84 MG/DL (ref 70–99)
HBA1C MFR BLD: 5 % (ref ?–5.7)
HCT VFR BLD AUTO: 42.7 %
HDLC SERPL-MCNC: 40 MG/DL (ref 40–59)
HGB BLD-MCNC: 13.6 G/DL
IMM GRANULOCYTES # BLD AUTO: 0.01 X10(3) UL (ref 0–1)
IMM GRANULOCYTES NFR BLD: 0.2 %
LDLC SERPL CALC-MCNC: 94 MG/DL (ref ?–100)
LYMPHOCYTES # BLD AUTO: 2.46 X10(3) UL (ref 1–4)
LYMPHOCYTES NFR BLD AUTO: 38.8 %
MCH RBC QN AUTO: 28.2 PG (ref 26–34)
MCHC RBC AUTO-ENTMCNC: 31.9 G/DL (ref 31–37)
MCV RBC AUTO: 88.6 FL
MONOCYTES # BLD AUTO: 0.52 X10(3) UL (ref 0.1–1)
MONOCYTES NFR BLD AUTO: 8.2 %
NEUTROPHILS # BLD AUTO: 3.15 X10 (3) UL (ref 1.5–7.7)
NEUTROPHILS # BLD AUTO: 3.15 X10(3) UL (ref 1.5–7.7)
NEUTROPHILS NFR BLD AUTO: 49.7 %
NONHDLC SERPL-MCNC: 118 MG/DL (ref ?–130)
OSMOLALITY SERPL CALC.SUM OF ELEC: 288 MOSM/KG (ref 275–295)
PLATELET # BLD AUTO: 253 10(3)UL (ref 150–450)
POTASSIUM SERPL-SCNC: 4 MMOL/L (ref 3.5–5.1)
PROT SERPL-MCNC: 7.3 G/DL (ref 6.4–8.2)
RBC # BLD AUTO: 4.82 X10(6)UL
SODIUM SERPL-SCNC: 139 MMOL/L (ref 136–145)
T4 FREE SERPL-MCNC: 1 NG/DL (ref 0.8–1.7)
TRIGL SERPL-MCNC: 132 MG/DL (ref 30–149)
TSI SER-ACNC: 4.24 MIU/ML (ref 0.36–3.74)
VLDLC SERPL CALC-MCNC: 22 MG/DL (ref 0–30)
WBC # BLD AUTO: 6.3 X10(3) UL (ref 4–11)

## 2022-10-11 PROCEDURE — 80053 COMPREHEN METABOLIC PANEL: CPT

## 2022-10-11 PROCEDURE — 84439 ASSAY OF FREE THYROXINE: CPT

## 2022-10-11 PROCEDURE — 85025 COMPLETE CBC W/AUTO DIFF WBC: CPT

## 2022-10-11 PROCEDURE — 90471 IMMUNIZATION ADMIN: CPT | Performed by: INTERNAL MEDICINE

## 2022-10-11 PROCEDURE — 80061 LIPID PANEL: CPT

## 2022-10-11 PROCEDURE — 36415 COLL VENOUS BLD VENIPUNCTURE: CPT

## 2022-10-11 PROCEDURE — 3078F DIAST BP <80 MM HG: CPT | Performed by: INTERNAL MEDICINE

## 2022-10-11 PROCEDURE — 3075F SYST BP GE 130 - 139MM HG: CPT | Performed by: INTERNAL MEDICINE

## 2022-10-11 PROCEDURE — 83036 HEMOGLOBIN GLYCOSYLATED A1C: CPT

## 2022-10-11 PROCEDURE — 90686 IIV4 VACC NO PRSV 0.5 ML IM: CPT | Performed by: INTERNAL MEDICINE

## 2022-10-11 PROCEDURE — 84443 ASSAY THYROID STIM HORMONE: CPT

## 2022-10-11 PROCEDURE — 3008F BODY MASS INDEX DOCD: CPT | Performed by: INTERNAL MEDICINE

## 2022-10-11 PROCEDURE — 99396 PREV VISIT EST AGE 40-64: CPT | Performed by: INTERNAL MEDICINE

## 2022-10-11 RX ORDER — MELOXICAM 15 MG/1
15 TABLET ORAL DAILY
COMMUNITY
Start: 2022-08-29

## 2022-10-13 NOTE — TELEPHONE ENCOUNTER
----- Message from Katlyn Mann MD sent at 10/12/2022  7:11 PM CDT -----  Lab results reviewed. Hemoglobin A1c is normal at 5.0--therefore no evidence of diabetes. Active thyroid hormone level is normal.    General chemistries including electrolytes, kidney function and liver function test are normal.    Cholesterol is 158 with LDL of 94--this is at goal LDL of less than 130    PSA is normal.    CBC shows normal white cell count and normal hemoglobin. Therefore, overall, all labs look good.   Continue same medications

## 2022-12-13 ENCOUNTER — TELEPHONE (OUTPATIENT)
Dept: INTERNAL MEDICINE CLINIC | Facility: CLINIC | Age: 57
End: 2022-12-13

## 2022-12-13 NOTE — TELEPHONE ENCOUNTER
LEFT KNEE ARTHROSCOPY, MENISCECTOMY, OPEN QUADRICEPS TENDON REPAIR with Dr. Daniela Elizabeth on 12/23/22 12/6/22 EKG: normal sinus rhythm; t wave abnormality, consider inferior ischemia; abnormal ECG       To Dr. Liss Mccormack:  Please advise.

## 2022-12-13 NOTE — TELEPHONE ENCOUNTER
Pt. Called stating he is having  Surgery on 12/23/22 for a torn quad. His surgeons office did an EKG and it came back abnormal.  He was instructed to schedule an appt. With Dr. Rocael Wilkes this week. No openings. Please advise.

## 2022-12-13 NOTE — TELEPHONE ENCOUNTER
Received the patient's EKG results via fax from [facility]. Results placed in Dr Laurie Bonner with barcode.

## 2022-12-15 ENCOUNTER — OFFICE VISIT (OUTPATIENT)
Dept: INTERNAL MEDICINE CLINIC | Facility: CLINIC | Age: 57
End: 2022-12-15
Payer: COMMERCIAL

## 2022-12-15 VITALS
HEART RATE: 64 BPM | HEIGHT: 72 IN | OXYGEN SATURATION: 96 % | BODY MASS INDEX: 42.66 KG/M2 | WEIGHT: 315 LBS | TEMPERATURE: 98 F | SYSTOLIC BLOOD PRESSURE: 132 MMHG | DIASTOLIC BLOOD PRESSURE: 80 MMHG

## 2022-12-15 DIAGNOSIS — E78.00 PURE HYPERCHOLESTEROLEMIA: ICD-10-CM

## 2022-12-15 DIAGNOSIS — Z01.818 PREOP EXAMINATION: Primary | ICD-10-CM

## 2022-12-15 DIAGNOSIS — E66.01 OBESITY, CLASS III, BMI 40-49.9 (MORBID OBESITY) (HCC): ICD-10-CM

## 2022-12-15 DIAGNOSIS — M25.562 LEFT KNEE PAIN, UNSPECIFIED CHRONICITY: ICD-10-CM

## 2022-12-15 DIAGNOSIS — I10 BENIGN ESSENTIAL HTN: ICD-10-CM

## 2022-12-15 DIAGNOSIS — G47.33 OBSTRUCTIVE SLEEP APNEA (ADULT) (PEDIATRIC): ICD-10-CM

## 2022-12-15 DIAGNOSIS — R94.31 ABNORMAL EKG: ICD-10-CM

## 2022-12-15 PROCEDURE — 3079F DIAST BP 80-89 MM HG: CPT | Performed by: INTERNAL MEDICINE

## 2022-12-15 PROCEDURE — 99214 OFFICE O/P EST MOD 30 MIN: CPT | Performed by: INTERNAL MEDICINE

## 2022-12-15 PROCEDURE — 3075F SYST BP GE 130 - 139MM HG: CPT | Performed by: INTERNAL MEDICINE

## 2022-12-15 PROCEDURE — 3008F BODY MASS INDEX DOCD: CPT | Performed by: INTERNAL MEDICINE

## 2022-12-15 RX ORDER — LISINOPRIL 10 MG/1
TABLET ORAL
Qty: 90 TABLET | Refills: 3 | Status: SHIPPED | OUTPATIENT
Start: 2022-12-15

## 2022-12-20 ENCOUNTER — HOSPITAL ENCOUNTER (OUTPATIENT)
Dept: CV DIAGNOSTICS | Facility: HOSPITAL | Age: 57
Discharge: HOME | End: 2022-12-20
Attending: INTERNAL MEDICINE
Payer: COMMERCIAL

## 2022-12-20 ENCOUNTER — HOSPITAL ENCOUNTER (OUTPATIENT)
Dept: NUCLEAR MEDICINE | Facility: HOSPITAL | Age: 57
Discharge: HOME | End: 2022-12-20
Attending: INTERNAL MEDICINE
Payer: COMMERCIAL

## 2022-12-20 DIAGNOSIS — R94.31 ABNORMAL EKG: ICD-10-CM

## 2022-12-20 PROCEDURE — 93018 CV STRESS TEST I&R ONLY: CPT | Performed by: INTERNAL MEDICINE

## 2022-12-20 PROCEDURE — 93017 CV STRESS TEST TRACING ONLY: CPT | Performed by: INTERNAL MEDICINE

## 2022-12-20 PROCEDURE — 93016 CV STRESS TEST SUPVJ ONLY: CPT | Performed by: INTERNAL MEDICINE

## 2022-12-20 PROCEDURE — 78452 HT MUSCLE IMAGE SPECT MULT: CPT | Performed by: INTERNAL MEDICINE

## 2022-12-21 ENCOUNTER — TELEPHONE (OUTPATIENT)
Dept: INTERNAL MEDICINE CLINIC | Facility: CLINIC | Age: 57
End: 2022-12-21

## 2022-12-21 DIAGNOSIS — R94.39 ABNORMAL STRESS TEST: Primary | ICD-10-CM

## 2022-12-21 LAB
% OF MAX PREDICTED HR: 100 %
MAX DIASTOLIC BP: 68 MMHG
MAX HEART RATE: 81 BPM
MAX PREDICTED HEART RATE: 165 BPM
MAX SYSTOLIC BP: 153 MMHG
MAX WORK LOAD: 10

## 2022-12-21 NOTE — TELEPHONE ENCOUNTER
----- Message from Dalia Dhillon MD sent at 12/21/2022  8:47 AM CST -----  Nuclear medicine stress testing is showing evidence of probable prior heart attack. Therefore will need further cardiac evaluation before proceeding with any surgery. Unfortunately, will need to postpone patient's upcoming orthopedic surgery and will need to have patient see cardiology for further management which may require medication and/or cardiac angiogram.  I have placed referral for cardiology. Patient should schedule appointment with cardiologist at his convenience.

## 2022-12-21 NOTE — TELEPHONE ENCOUNTER
Spoke with patient. Relayed MD message. Pt verbalized understanding. To Dr. Jennifer Quinonez:  Pt is requesting to speak with you; pt has questions regarding Nuclear medicine stress test results.

## 2022-12-21 NOTE — TELEPHONE ENCOUNTER
LMTCB     Clinical to attempt to reach patient again regarding abnormal findings below. Please review Dr. Lexus Cruz note in it's entirety.

## 2022-12-22 NOTE — TELEPHONE ENCOUNTER
I spoke with patient and explained to him the results of stress test.  He has appointment scheduled for cardiology next week  All his questions were answered and he agrees to follow through with cardiology evaluation

## 2022-12-30 ENCOUNTER — TELEPHONE (OUTPATIENT)
Dept: GASTROENTEROLOGY | Facility: CLINIC | Age: 57
End: 2022-12-30

## 2022-12-30 DIAGNOSIS — Z12.11 SCREEN FOR COLON CANCER: Primary | ICD-10-CM

## 2023-01-03 ENCOUNTER — TELEPHONE (OUTPATIENT)
Dept: INTERNAL MEDICINE CLINIC | Facility: CLINIC | Age: 58
End: 2023-01-03

## 2023-01-03 NOTE — TELEPHONE ENCOUNTER
Pt called   Recently saw Dr Vivienne Mccall   Does pt need to be seen, or can he drop off form, to have short term disability updated to extend his time off  Pt needs form updated this week    Please call to advise [phone]

## 2023-01-10 ENCOUNTER — ORDER TRANSCRIPTION (OUTPATIENT)
Dept: ADMINISTRATIVE | Facility: HOSPITAL | Age: 58
End: 2023-01-10

## 2023-01-10 DIAGNOSIS — I10 HTN (HYPERTENSION): ICD-10-CM

## 2023-01-10 DIAGNOSIS — R94.31 ABNORMAL EKG: Primary | ICD-10-CM

## 2023-01-10 DIAGNOSIS — I10 PRIMARY HYPERTENSION: ICD-10-CM

## 2023-01-11 ENCOUNTER — ORDER TRANSCRIPTION (OUTPATIENT)
Dept: ADMINISTRATIVE | Facility: HOSPITAL | Age: 58
End: 2023-01-11

## 2023-01-11 DIAGNOSIS — R94.31 ABNORMAL EKG: Primary | ICD-10-CM

## 2023-01-11 DIAGNOSIS — I10 HYPERTENSION: ICD-10-CM

## 2023-01-23 ENCOUNTER — HOSPITAL ENCOUNTER (OUTPATIENT)
Dept: CT IMAGING | Facility: HOSPITAL | Age: 58
Discharge: HOME | End: 2023-01-23
Attending: INTERNAL MEDICINE
Payer: COMMERCIAL

## 2023-01-23 VITALS
RESPIRATION RATE: 16 BRPM | BODY MASS INDEX: 42.66 KG/M2 | DIASTOLIC BLOOD PRESSURE: 66 MMHG | SYSTOLIC BLOOD PRESSURE: 141 MMHG | HEIGHT: 72 IN | HEART RATE: 63 BPM | WEIGHT: 315 LBS

## 2023-01-23 DIAGNOSIS — I10 HTN (HYPERTENSION): ICD-10-CM

## 2023-01-23 DIAGNOSIS — I10 PRIMARY HYPERTENSION: ICD-10-CM

## 2023-01-23 DIAGNOSIS — R94.31 ABNORMAL EKG: ICD-10-CM

## 2023-01-23 LAB
CREAT BLD-MCNC: 0.9 MG/DL
GFR SERPLBLD BASED ON 1.73 SQ M-ARVRAT: 101 ML/MIN/1.73M2 (ref 60–?)

## 2023-01-23 PROCEDURE — 0502T CTA FRACTIONAL FLOW RESERVE ANALYSIS (CPT=0503T/0502T): CPT | Performed by: INTERNAL MEDICINE

## 2023-01-23 PROCEDURE — 75574 CT ANGIO HRT W/3D IMAGE: CPT | Performed by: INTERNAL MEDICINE

## 2023-01-23 PROCEDURE — 0503T CTA FRACTIONAL FLOW RESERVE ANALYSIS (CPT=0503T/0502T): CPT | Performed by: INTERNAL MEDICINE

## 2023-01-23 PROCEDURE — 82565 ASSAY OF CREATININE: CPT

## 2023-01-23 RX ORDER — DILTIAZEM HYDROCHLORIDE 5 MG/ML
5 INJECTION INTRAVENOUS SEE ADMIN INSTRUCTIONS
Status: DISCONTINUED | OUTPATIENT
Start: 2023-01-23 | End: 2023-01-25

## 2023-01-23 RX ORDER — METOPROLOL TARTRATE 5 MG/5ML
5 INJECTION INTRAVENOUS SEE ADMIN INSTRUCTIONS
Status: DISCONTINUED | OUTPATIENT
Start: 2023-01-23 | End: 2023-01-25

## 2023-01-23 RX ORDER — NITROGLYCERIN 0.4 MG/1
0.4 TABLET SUBLINGUAL ONCE
Status: COMPLETED | OUTPATIENT
Start: 2023-01-23 | End: 2023-01-23

## 2023-01-23 RX ADMIN — Medication 50 MG: at 07:58:00

## 2023-01-23 RX ADMIN — NITROGLYCERIN 0.4 MG: 0.4 TABLET SUBLINGUAL at 08:49:00

## 2023-01-23 NOTE — IMAGING NOTE
TO RAD HOLDING AT 0745      HX TAKEN :PT GETTING CARDIAC CLEARANCE FOR SURGERY ON KNEE , HAD ABNORMAL EKG, NOW HERE FOR CTA     PT CONSENTED AT 0754     BASELINE VITAL SIGNS   HR 63  /66    CTA ORDERED BY DR FONG  WAS PT GIVEN CTA  PREMEDS NO, IF YES METOPROLOL 50 MG     METOPROLOL PO GIVEN 50 MILLIGRAMS   AT 0758    18 GAUGE IV STARTED AT 0830  POC TESTING COMPLETED GFR =101    CREATINE =0.9    TO CT TABLE @ 0845    CONNECT TO MONITOR  VS /54 HR 59      NITROGLYCERIN 0.4 MILLIGRAMS SUBLINGUAL GIVEN AT 0849     CALCIUM SCORE COMPLETED AT 0853     INJECTION STARTED AT 0855 HR 53 DURING SCAN PROCEDURE COMPLETE     POST SCAN VS /62 HR 57  AT 0858     PT TO HOLDING AREA  VS /82  HR 58 AT 0920    AVS  PROVIDED   N/A FOR ER OR INPATIENTS    0930 DISCHARGED HOME

## 2023-02-01 ENCOUNTER — TELEPHONE (OUTPATIENT)
Dept: INTERNAL MEDICINE CLINIC | Facility: CLINIC | Age: 58
End: 2023-02-01

## 2023-02-01 NOTE — TELEPHONE ENCOUNTER
Patient is calling and requesting a note for work. Patient is asking if the note could  be sent to his employer [employer]. Patient is having surgery on 2/21/23. Patient is [facility] and have the following information included in the note. Patient is having surgery on 2/21/23 and will be returning to the doctor for a follow up on 3/7/23. Patient will be out of work until this time. Please include the the patients \"leave ID\" number [id number]  on the letter.        Send to Fax number [phone]    Any questions call patient at  [phone]

## 2023-02-01 NOTE — TELEPHONE ENCOUNTER
Dr. Beto Qiu,    [facility] to generate the following letter? Dr. Armendariz Slight  [address] [facility] [place]    2023    [address] [place] [address]  Fax [phone]    Re: Svitlana Thomas # [id number]      To Whom It May Concern:    Mr. Munira Kulkarni ( 1965) is a patient under my medical care; He was referred to an Orthopedic Specialist (Aniket Keating MD) and will now need surgery which is scheduled for 2023. Claudine Man will be off work and unable to give a [id number] date until evaluated for post -surgical follow-up; He has this visit scheduled already and as of now will need to be excused from full work duties from 23 - 3/7/23. Any questions or concerns regarding time off, please contact our patient directly:  Munira Kulkarni  [address] [address]  [place]:  Aniket Keating MD  [address] [address], [address]  [id number]    As his primary care provider, we will also be here for a resource to our patient via [address] [place] [address] [address] or Phone [phone].      Thank you,         Murray [place] MD

## 2023-02-18 ENCOUNTER — LAB ENCOUNTER (OUTPATIENT)
Dept: LAB | Facility: HOSPITAL | Age: 58
End: 2023-02-18
Attending: STUDENT IN AN ORGANIZED HEALTH CARE EDUCATION/TRAINING PROGRAM
Payer: MEDICAID

## 2023-02-18 DIAGNOSIS — Z01.818 PREOP TESTING: ICD-10-CM

## 2023-02-18 LAB — SARS-COV-2 RNA RESP QL NAA+PROBE: NOT DETECTED

## 2023-02-21 ENCOUNTER — HOSPITAL ENCOUNTER (OUTPATIENT)
Facility: HOSPITAL | Age: 58
Setting detail: HOSPITAL OUTPATIENT SURGERY
Discharge: HOME | End: 2023-02-21
Attending: STUDENT IN AN ORGANIZED HEALTH CARE EDUCATION/TRAINING PROGRAM | Admitting: STUDENT IN AN ORGANIZED HEALTH CARE EDUCATION/TRAINING PROGRAM
Payer: MEDICAID

## 2023-02-21 ENCOUNTER — ANESTHESIA (OUTPATIENT)
Dept: SURGERY | Facility: HOSPITAL | Age: 58
End: 2023-02-21
Payer: MEDICAID

## 2023-02-21 ENCOUNTER — ANESTHESIA EVENT (OUTPATIENT)
Dept: SURGERY | Facility: HOSPITAL | Age: 58
End: 2023-02-21
Payer: MEDICAID

## 2023-02-21 VITALS
DIASTOLIC BLOOD PRESSURE: 77 MMHG | RESPIRATION RATE: 16 BRPM | BODY MASS INDEX: 42.66 KG/M2 | HEIGHT: 72 IN | HEART RATE: 62 BPM | SYSTOLIC BLOOD PRESSURE: 134 MMHG | WEIGHT: 315 LBS | TEMPERATURE: 99 F | OXYGEN SATURATION: 95 %

## 2023-02-21 DIAGNOSIS — Z01.818 PREOP TESTING: Primary | ICD-10-CM

## 2023-02-21 PROCEDURE — 64450 NJX AA&/STRD OTHER PN/BRANCH: CPT | Performed by: STUDENT IN AN ORGANIZED HEALTH CARE EDUCATION/TRAINING PROGRAM

## 2023-02-21 PROCEDURE — 0LQM0ZZ REPAIR LEFT UPPER LEG TENDON, OPEN APPROACH: ICD-10-PCS | Performed by: STUDENT IN AN ORGANIZED HEALTH CARE EDUCATION/TRAINING PROGRAM

## 2023-02-21 PROCEDURE — 76942 ECHO GUIDE FOR BIOPSY: CPT | Performed by: STUDENT IN AN ORGANIZED HEALTH CARE EDUCATION/TRAINING PROGRAM

## 2023-02-21 PROCEDURE — 0SBD4ZZ EXCISION OF LEFT KNEE JOINT, PERCUTANEOUS ENDOSCOPIC APPROACH: ICD-10-PCS | Performed by: STUDENT IN AN ORGANIZED HEALTH CARE EDUCATION/TRAINING PROGRAM

## 2023-02-21 RX ORDER — HYDROMORPHONE HYDROCHLORIDE 1 MG/ML
0.6 INJECTION, SOLUTION INTRAMUSCULAR; INTRAVENOUS; SUBCUTANEOUS EVERY 5 MIN PRN
Status: DISCONTINUED | OUTPATIENT
Start: 2023-02-21 | End: 2023-02-21

## 2023-02-21 RX ORDER — PROCHLORPERAZINE EDISYLATE 5 MG/ML
5 INJECTION INTRAMUSCULAR; INTRAVENOUS EVERY 8 HOURS PRN
Status: DISCONTINUED | OUTPATIENT
Start: 2023-02-21 | End: 2023-02-21

## 2023-02-21 RX ORDER — HYDROMORPHONE HYDROCHLORIDE 1 MG/ML
0.2 INJECTION, SOLUTION INTRAMUSCULAR; INTRAVENOUS; SUBCUTANEOUS EVERY 5 MIN PRN
Status: DISCONTINUED | OUTPATIENT
Start: 2023-02-21 | End: 2023-02-21

## 2023-02-21 RX ORDER — ROCURONIUM BROMIDE 10 MG/ML
INJECTION, SOLUTION INTRAVENOUS AS NEEDED
Status: DISCONTINUED | OUTPATIENT
Start: 2023-02-21 | End: 2023-02-21 | Stop reason: SURG

## 2023-02-21 RX ORDER — ONDANSETRON 2 MG/ML
INJECTION INTRAMUSCULAR; INTRAVENOUS AS NEEDED
Status: DISCONTINUED | OUTPATIENT
Start: 2023-02-21 | End: 2023-02-21 | Stop reason: SURG

## 2023-02-21 RX ORDER — DEXAMETHASONE SODIUM PHOSPHATE 10 MG/ML
INJECTION, SOLUTION INTRAMUSCULAR; INTRAVENOUS
Status: COMPLETED | OUTPATIENT
Start: 2023-02-21 | End: 2023-02-21

## 2023-02-21 RX ORDER — SODIUM CHLORIDE, SODIUM LACTATE, POTASSIUM CHLORIDE, CALCIUM CHLORIDE 600; 310; 30; 20 MG/100ML; MG/100ML; MG/100ML; MG/100ML
INJECTION, SOLUTION INTRAVENOUS CONTINUOUS
Status: DISCONTINUED | OUTPATIENT
Start: 2023-02-21 | End: 2023-02-21

## 2023-02-21 RX ORDER — GLYCOPYRROLATE 0.2 MG/ML
INJECTION, SOLUTION INTRAMUSCULAR; INTRAVENOUS AS NEEDED
Status: DISCONTINUED | OUTPATIENT
Start: 2023-02-21 | End: 2023-02-21 | Stop reason: SURG

## 2023-02-21 RX ORDER — BUPIVACAINE HYDROCHLORIDE AND EPINEPHRINE 5; 5 MG/ML; UG/ML
INJECTION, SOLUTION PERINEURAL AS NEEDED
Status: DISCONTINUED | OUTPATIENT
Start: 2023-02-21 | End: 2023-02-21 | Stop reason: HOSPADM

## 2023-02-21 RX ORDER — HYDROMORPHONE HYDROCHLORIDE 1 MG/ML
0.4 INJECTION, SOLUTION INTRAMUSCULAR; INTRAVENOUS; SUBCUTANEOUS EVERY 5 MIN PRN
Status: DISCONTINUED | OUTPATIENT
Start: 2023-02-21 | End: 2023-02-21

## 2023-02-21 RX ORDER — LIDOCAINE HYDROCHLORIDE 10 MG/ML
INJECTION, SOLUTION INFILTRATION; PERINEURAL
Status: COMPLETED | OUTPATIENT
Start: 2023-02-21 | End: 2023-02-21

## 2023-02-21 RX ORDER — METOCLOPRAMIDE 10 MG/1
10 TABLET ORAL ONCE
Status: COMPLETED | OUTPATIENT
Start: 2023-02-21 | End: 2023-02-21

## 2023-02-21 RX ORDER — DEXAMETHASONE SODIUM PHOSPHATE 4 MG/ML
VIAL (ML) INJECTION AS NEEDED
Status: DISCONTINUED | OUTPATIENT
Start: 2023-02-21 | End: 2023-02-21 | Stop reason: SURG

## 2023-02-21 RX ORDER — ONDANSETRON 2 MG/ML
4 INJECTION INTRAMUSCULAR; INTRAVENOUS EVERY 6 HOURS PRN
Status: DISCONTINUED | OUTPATIENT
Start: 2023-02-21 | End: 2023-02-21

## 2023-02-21 RX ORDER — LIDOCAINE HYDROCHLORIDE 10 MG/ML
INJECTION, SOLUTION EPIDURAL; INFILTRATION; INTRACAUDAL; PERINEURAL AS NEEDED
Status: DISCONTINUED | OUTPATIENT
Start: 2023-02-21 | End: 2023-02-21 | Stop reason: SURG

## 2023-02-21 RX ORDER — ROPIVACAINE HYDROCHLORIDE 5 MG/ML
INJECTION, SOLUTION EPIDURAL; INFILTRATION; PERINEURAL
Status: COMPLETED | OUTPATIENT
Start: 2023-02-21 | End: 2023-02-21

## 2023-02-21 RX ORDER — NALOXONE HYDROCHLORIDE 0.4 MG/ML
80 INJECTION, SOLUTION INTRAMUSCULAR; INTRAVENOUS; SUBCUTANEOUS AS NEEDED
Status: DISCONTINUED | OUTPATIENT
Start: 2023-02-21 | End: 2023-02-21

## 2023-02-21 RX ORDER — MIDAZOLAM HYDROCHLORIDE 1 MG/ML
INJECTION INTRAMUSCULAR; INTRAVENOUS
Status: COMPLETED | OUTPATIENT
Start: 2023-02-21 | End: 2023-02-21

## 2023-02-21 RX ORDER — ACETAMINOPHEN 500 MG
1000 TABLET ORAL ONCE
Status: COMPLETED | OUTPATIENT
Start: 2023-02-21 | End: 2023-02-21

## 2023-02-21 RX ORDER — FAMOTIDINE 20 MG/1
20 TABLET, FILM COATED ORAL ONCE
Status: COMPLETED | OUTPATIENT
Start: 2023-02-21 | End: 2023-02-21

## 2023-02-21 RX ORDER — CEFAZOLIN SODIUM IN 0.9 % NACL 3 G/100 ML
3 INTRAVENOUS SOLUTION, PIGGYBACK (ML) INTRAVENOUS ONCE
Status: DISCONTINUED | OUTPATIENT
Start: 2023-02-21 | End: 2023-02-21 | Stop reason: HOSPADM

## 2023-02-21 RX ADMIN — ROCURONIUM BROMIDE 45 MG: 10 INJECTION, SOLUTION INTRAVENOUS at 13:05:00

## 2023-02-21 RX ADMIN — DEXAMETHASONE SODIUM PHOSPHATE 8 MG: 4 MG/ML VIAL (ML) INJECTION at 13:08:00

## 2023-02-21 RX ADMIN — ROCURONIUM BROMIDE 5 MG: 10 INJECTION, SOLUTION INTRAVENOUS at 12:59:00

## 2023-02-21 RX ADMIN — LIDOCAINE HYDROCHLORIDE 5 ML: 10 INJECTION, SOLUTION INFILTRATION; PERINEURAL at 12:22:00

## 2023-02-21 RX ADMIN — SODIUM CHLORIDE, SODIUM LACTATE, POTASSIUM CHLORIDE, CALCIUM CHLORIDE: 600; 310; 30; 20 INJECTION, SOLUTION INTRAVENOUS at 14:38:00

## 2023-02-21 RX ADMIN — GLYCOPYRROLATE 0.2 MG: 0.2 INJECTION, SOLUTION INTRAMUSCULAR; INTRAVENOUS at 13:05:00

## 2023-02-21 RX ADMIN — SODIUM CHLORIDE, SODIUM LACTATE, POTASSIUM CHLORIDE, CALCIUM CHLORIDE: 600; 310; 30; 20 INJECTION, SOLUTION INTRAVENOUS at 14:13:00

## 2023-02-21 RX ADMIN — DEXAMETHASONE SODIUM PHOSPHATE 10 MG: 10 INJECTION, SOLUTION INTRAMUSCULAR; INTRAVENOUS at 12:22:00

## 2023-02-21 RX ADMIN — MIDAZOLAM HYDROCHLORIDE 2 MG: 1 INJECTION INTRAMUSCULAR; INTRAVENOUS at 12:22:00

## 2023-02-21 RX ADMIN — ONDANSETRON 4 MG: 2 INJECTION INTRAMUSCULAR; INTRAVENOUS at 13:08:00

## 2023-02-21 RX ADMIN — LIDOCAINE HYDROCHLORIDE 50 MG: 10 INJECTION, SOLUTION EPIDURAL; INFILTRATION; INTRACAUDAL; PERINEURAL at 12:59:00

## 2023-02-21 RX ADMIN — ROPIVACAINE HYDROCHLORIDE 30 ML: 5 INJECTION, SOLUTION EPIDURAL; INFILTRATION; PERINEURAL at 12:22:00

## 2023-02-21 NOTE — ANESTHESIA PROCEDURE NOTES
Airway  Date/Time: 2/21/2023 1:01 PM  Urgency: Elective    Difficult airway    General Information and Staff    Patient location during procedure: OR  Anesthesiologist: Philly Case DO  Resident/CRNA: Tasneem Sellers CRNA  Performed: CRNA   Performed by: Tasneem Sellers CRNA  Authorized by: Philly Case DO      Indications and Patient Condition  Indications for airway management: anesthesia  Sedation level: deep  Preoxygenated: yes  Patient position: sniffing  MILS maintained throughout  Mask difficulty assessment: 1 - vent by mask    Final Airway Details  Final airway type: endotracheal airway      Successful airway: ETT  Cuffed: yes   Successful intubation technique: Video laryngoscopy  Facilitating devices/methods: intubating stylet  Endotracheal tube insertion site: oral  Blade size: #4  ETT size (mm): 7.5    Cormack-Lehane Classification: grade I - full view of glottis  Placement verified by: chest auscultation and capnometry   Measured from: lips  ETT to lips (cm): 24  Number of attempts at approach: 1    Additional Comments  GLIDESCOPE USED D/T SUSPECTED DIFFICULT AIRWAY.  GRADE 1 VIEW WITH GLIDESCOPE

## 2023-02-21 NOTE — ANESTHESIA POSTPROCEDURE EVALUATION
Patient: Reno Garrison    Procedure Summary     Date: 02/21/23 Room / Location: [address] [place] [address] MAIN OR 17 / [address] [place] [address] MAIN OR    Anesthesia Start: 1255 Anesthesia Stop: 0245    Procedures:       Left knee arthroscopy, meniscectomy, open quadriceps tendon repair (Left: Knee)      PATELLA TENDON REPAIR (Left: Knee) Diagnosis: (Rupture of left quadriceps muscle, initial encounter)    Surgeons: Cristhian Hsu MD Anesthesiologist: Demond Byers DO    Anesthesia Type: general ASA Status: 3          Anesthesia Type: general    Vitals Value Taken Time   /77 02/21/23 1438   Temp  02/21/23 1438   Pulse 62 02/21/23 1437   Resp 13 02/21/23 1437   SpO2 99 % 02/21/23 1437   Vitals shown include unvalidated device data.     [address] [place] [address] AN Post Evaluation:   Patient Evaluated in PACU  Patient Participation: complete - patient participated  Level of Consciousness: awake and awake and alert  Pain Score: 0  Pain Management: satisfactory to patient  Airway Patency:patent  Yes    Cardiovascular Status: acceptable  Respiratory Status: acceptable  Postoperative Hydration acceptable      Niyah Dean CRNA  2/21/2023 2:38 PM

## 2023-02-21 NOTE — ANESTHESIA PROCEDURE NOTES
Peripheral Block    Date/Time: 2/21/2023 12:22 PM    Performed by: Wai Chadwick DO  Authorized by: Wai Chadwick DO      General Information and Staff    Start Time:  2/21/2023 12:22 PM  End Time:  2/21/2023 12:28 PM  Anesthesiologist:  Wai Chadwick DO  Performed by: Anesthesiologist  Patient Location:  PACU    Block Placement: Pre Induction  Site Identification: real time ultrasound guided and image stored and retrievable      Reason for Block: at surgeon's request and post-op pain management    Preanesthetic Checklist: 2 patient identifers, IV checked, site marked, risks and benefits discussed, monitors and equipment checked, pre-op evaluation, timeout performed, anesthesia consent, sterile technique used, no prohibitive neurological deficits and no local skin infection at insertion site      Procedure Details    Patient Position:  Supine  Prep: ChloraPrep    Monitoring:  Cardiac monitor  Block Type:  Saphenous  Laterality:  Left  Injection Technique:  Single-shot    Needle    Needle Type:  Echogenic  Needle Gauge:  20 G  Needle Length:  100 mm  Needle Localization:  Ultrasound guidance  Reason for Ultrasound Use: appropriate spread of the medication was noted in real time and no ultrasound evidence of intravascular and/or intraneural injection            Assessment    Injection Assessment:  Good spread noted, incremental injection, low pressure, local visualized surrounding nerve on ultrasound, negative aspiration for heme, no pain on injection and negative resistance  Paresthesia Pain:  None  Heart Rate Change: No    - Patient tolerated block procedure well without evidence of immediate block related complications.      Medications  2/21/2023 12:22 PM  Midazolam (VERSED)injection 2mg/2ml, 2 mg  lidocaine injection 1%, 5 mL  ropivacaine (NAROPIN) injection 0.5%, 30 mL  dexamethasone (DECADRON) PF injection 10 mg/ml, 10 mg    Additional Comments

## 2023-03-27 ENCOUNTER — TELEPHONE (OUTPATIENT)
Facility: CLINIC | Age: 58
End: 2023-03-27

## 2023-03-27 NOTE — PAT NURSING NOTE
Called patient for PAT regarding upcoming colonoscopy with Dr. Ozzy Phillips on 4/4/23. Patient had a recent knee surgery in February. Spoke with Radha at Dr. Ozzy Phillips office to see if patient would need any antibiotic before colonoscopy. She stated she would reach out to the orthopedic and to have patient reach out to orthopedic to see if an antibiotic is required. Patient was informed of above and agreeable to reach out to orthopedic to see if any antibiotic is needed.

## 2023-03-27 NOTE — TELEPHONE ENCOUNTER
Faxed request for ortho clearnace to Dr FRANCISCO Mcdaniel's office ([phone]) ([phone]). The patient is s/p left knee arthroscopy 2/21/2023.

## 2023-04-03 ENCOUNTER — TELEPHONE (OUTPATIENT)
Facility: CLINIC | Age: 58
End: 2023-04-03

## 2023-04-03 NOTE — TELEPHONE ENCOUNTER
I called [facility] pharmacy. I spoke to Delaney (Pharmacy Tech). She stated the patient picked up the bowel prep medication on 2022. I called and spoke to the patient, MATTIE and name verified. He has the bowel prep medication with expiration date on 2023. I told the patient he can still use it. I answered all bowel prep questions and he verbalized understanding of the info given.

## 2023-04-04 ENCOUNTER — HOSPITAL ENCOUNTER (OUTPATIENT)
Facility: HOSPITAL | Age: 58
Setting detail: HOSPITAL OUTPATIENT SURGERY
Discharge: HOME | End: 2023-04-04
Attending: INTERNAL MEDICINE | Admitting: INTERNAL MEDICINE
Payer: MEDICAID

## 2023-04-04 ENCOUNTER — ANESTHESIA EVENT (OUTPATIENT)
Dept: ENDOSCOPY | Facility: HOSPITAL | Age: 58
End: 2023-04-04
Payer: MEDICAID

## 2023-04-04 ENCOUNTER — ANESTHESIA (OUTPATIENT)
Dept: ENDOSCOPY | Facility: HOSPITAL | Age: 58
End: 2023-04-04
Payer: MEDICAID

## 2023-04-04 VITALS
WEIGHT: 315 LBS | HEIGHT: 72 IN | BODY MASS INDEX: 42.66 KG/M2 | DIASTOLIC BLOOD PRESSURE: 84 MMHG | HEART RATE: 69 BPM | TEMPERATURE: 98 F | SYSTOLIC BLOOD PRESSURE: 142 MMHG | OXYGEN SATURATION: 98 % | RESPIRATION RATE: 26 BRPM

## 2023-04-04 DIAGNOSIS — Z12.11 SCREEN FOR COLON CANCER: ICD-10-CM

## 2023-04-04 PROCEDURE — 45385 COLONOSCOPY W/LESION REMOVAL: CPT | Performed by: INTERNAL MEDICINE

## 2023-04-04 PROCEDURE — 0DBN8ZX EXCISION OF SIGMOID COLON, VIA NATURAL OR ARTIFICIAL OPENING ENDOSCOPIC, DIAGNOSTIC: ICD-10-PCS | Performed by: INTERNAL MEDICINE

## 2023-04-04 PROCEDURE — 0DBL8ZX EXCISION OF TRANSVERSE COLON, VIA NATURAL OR ARTIFICIAL OPENING ENDOSCOPIC, DIAGNOSTIC: ICD-10-PCS | Performed by: INTERNAL MEDICINE

## 2023-04-04 RX ORDER — SODIUM CHLORIDE, SODIUM LACTATE, POTASSIUM CHLORIDE, CALCIUM CHLORIDE 600; 310; 30; 20 MG/100ML; MG/100ML; MG/100ML; MG/100ML
INJECTION, SOLUTION INTRAVENOUS CONTINUOUS
Status: DISCONTINUED | OUTPATIENT
Start: 2023-04-04 | End: 2023-04-04

## 2023-04-04 RX ORDER — NALOXONE HYDROCHLORIDE 0.4 MG/ML
80 INJECTION, SOLUTION INTRAMUSCULAR; INTRAVENOUS; SUBCUTANEOUS AS NEEDED
Status: DISCONTINUED | OUTPATIENT
Start: 2023-04-04 | End: 2023-04-04

## 2023-04-04 RX ORDER — SODIUM CHLORIDE, SODIUM LACTATE, POTASSIUM CHLORIDE, CALCIUM CHLORIDE 600; 310; 30; 20 MG/100ML; MG/100ML; MG/100ML; MG/100ML
INJECTION, SOLUTION INTRAVENOUS CONTINUOUS PRN
Status: DISCONTINUED | OUTPATIENT
Start: 2023-04-04 | End: 2023-04-04 | Stop reason: SURG

## 2023-04-04 RX ADMIN — SODIUM CHLORIDE, SODIUM LACTATE, POTASSIUM CHLORIDE, CALCIUM CHLORIDE: 600; 310; 30; 20 INJECTION, SOLUTION INTRAVENOUS at 08:56:00

## 2023-04-04 RX ADMIN — SODIUM CHLORIDE, SODIUM LACTATE, POTASSIUM CHLORIDE, CALCIUM CHLORIDE: 600; 310; 30; 20 INJECTION, SOLUTION INTRAVENOUS at 09:31:00

## 2023-04-04 NOTE — DISCHARGE INSTRUCTIONS

## 2023-04-04 NOTE — OPERATIVE REPORT
[address] Endoscopy Report      Date of Procedure:  04/04/23      Preoperative Diagnosis:  Colorectal cancer screening      Postoperative Diagnosis:  Diminutive colon polyps      Procedure:    Colonoscopy with polypectomy      Surgeon:  Ashlyn Mueller M.D. Anesthesia:  Monitored anesthesia care  Cecal withdrawal time: 21 minutes  EBL:  Insignificant      Brief History: This is a 56year old male who presents for a screening colonoscopy. He has had no lower gastrointestinal tract symptoms, signs or family history of colorectal cancer. Technique:  After informed consent, the patient was placed in the left lateral recumbent position. Digital rectal examination revealed no palpable intraluminal abnormalities. An Olympus variable stiffness 190 series HD colonoscope was inserted into the rectum and advanced under direct vision by following the lumen to the terminal ileum. The colon was examined upon withdrawal in the left lateral recumbent position. Findings:  The preparation of the colon was very good. The terminal ileum was examined for several cm and visually normal.  The ileocecal valve was well preserved. The visualized colonic mucosa from the cecum to the anal verge was normal with an intact vascular pattern. There were #3 polyps seen within the colon which removed as follows:    1. In the distal transverse colon there were #2 3-4 mm sessile polyps which were cold snare excised and retrieved. 2.  In the distal sigmoid colon there was a 3 mm sessile polyp which was cold snare excised and retrieved. Inspection of all sites revealed no evidence of ongoing bleeding. There were a few tiny polyps seen in the rectum that disappeared completely with air insufflation and were left undisturbed. There were no other colonic polyps, definite diverticula, mass lesions, vascular anomalies or signs of inflammation seen.   Retroflexion in the rectum revealed incidental internal hemorrhoids. The procedure was well tolerated without immediate complication. Impression:  1. Diminutive colon polyps  2. Otherwise normal colonoscopy to the terminal ileum    Recommendations: Follow-up biopsy results. Polyp histology to determine recommendations regarding follow-up.         Nikita Saleh MD  4/4/2023

## 2023-04-06 ENCOUNTER — TELEPHONE (OUTPATIENT)
Dept: GASTROENTEROLOGY | Facility: CLINIC | Age: 58
End: 2023-04-06

## 2023-04-06 NOTE — TELEPHONE ENCOUNTER
Health maintenance updated. 7 year colonoscopy recall placed in patient outreach. Next due on 04/04/2030 per Dr. Ada Davis.

## 2023-06-12 DIAGNOSIS — E78.5 HYPERLIPIDEMIA, UNSPECIFIED HYPERLIPIDEMIA TYPE: ICD-10-CM

## 2023-06-13 RX ORDER — LISINOPRIL 10 MG/1
10 TABLET ORAL DAILY
Qty: 90 TABLET | Refills: 3 | Status: SHIPPED | OUTPATIENT
Start: 2023-06-13

## 2023-06-13 NOTE — TELEPHONE ENCOUNTER
Pt. Called back stating he is still taking the Atorvastatin, but he forgets to take it consistently.

## 2023-06-14 RX ORDER — ATORVASTATIN CALCIUM 10 MG/1
10 TABLET, FILM COATED ORAL NIGHTLY
Qty: 90 TABLET | Refills: 3 | Status: SHIPPED | OUTPATIENT
Start: 2023-06-14

## 2023-07-05 ENCOUNTER — OFFICE VISIT (OUTPATIENT)
Dept: INTERNAL MEDICINE CLINIC | Facility: CLINIC | Age: 58
End: 2023-07-05

## 2023-07-05 VITALS
HEIGHT: 72 IN | SYSTOLIC BLOOD PRESSURE: 138 MMHG | OXYGEN SATURATION: 98 % | DIASTOLIC BLOOD PRESSURE: 82 MMHG | BODY MASS INDEX: 42.66 KG/M2 | HEART RATE: 82 BPM | WEIGHT: 315 LBS

## 2023-07-05 DIAGNOSIS — I10 BENIGN ESSENTIAL HTN: ICD-10-CM

## 2023-07-05 DIAGNOSIS — M25.512 LEFT SHOULDER PAIN, UNSPECIFIED CHRONICITY: Primary | ICD-10-CM

## 2023-07-05 DIAGNOSIS — S76.112D RUPTURE OF LEFT QUADRICEPS TENDON, SUBSEQUENT ENCOUNTER: ICD-10-CM

## 2023-07-05 DIAGNOSIS — M79.645 PAIN OF LEFT THUMB: ICD-10-CM

## 2023-07-05 PROCEDURE — 3079F DIAST BP 80-89 MM HG: CPT | Performed by: INTERNAL MEDICINE

## 2023-07-05 PROCEDURE — 3008F BODY MASS INDEX DOCD: CPT | Performed by: INTERNAL MEDICINE

## 2023-07-05 PROCEDURE — 3075F SYST BP GE 130 - 139MM HG: CPT | Performed by: INTERNAL MEDICINE

## 2023-07-05 PROCEDURE — 99213 OFFICE O/P EST LOW 20 MIN: CPT | Performed by: INTERNAL MEDICINE

## 2024-01-06 ENCOUNTER — OFFICE VISIT (OUTPATIENT)
Dept: INTERNAL MEDICINE CLINIC | Facility: CLINIC | Age: 59
End: 2024-01-06

## 2024-01-06 VITALS
DIASTOLIC BLOOD PRESSURE: 68 MMHG | RESPIRATION RATE: 17 BRPM | HEART RATE: 70 BPM | HEIGHT: 72 IN | WEIGHT: 315 LBS | SYSTOLIC BLOOD PRESSURE: 112 MMHG | TEMPERATURE: 98 F | OXYGEN SATURATION: 99 % | BODY MASS INDEX: 42.66 KG/M2

## 2024-01-06 DIAGNOSIS — E78.5 HYPERLIPIDEMIA, UNSPECIFIED HYPERLIPIDEMIA TYPE: ICD-10-CM

## 2024-01-06 DIAGNOSIS — G47.33 OBSTRUCTIVE SLEEP APNEA (ADULT) (PEDIATRIC): ICD-10-CM

## 2024-01-06 DIAGNOSIS — I10 BENIGN ESSENTIAL HTN: ICD-10-CM

## 2024-01-06 DIAGNOSIS — Z00.00 ANNUAL PHYSICAL EXAM: Primary | ICD-10-CM

## 2024-01-06 DIAGNOSIS — R94.39 ABNORMAL STRESS TEST: ICD-10-CM

## 2024-01-06 DIAGNOSIS — S76.112D RUPTURE OF LEFT QUADRICEPS TENDON, SUBSEQUENT ENCOUNTER: ICD-10-CM

## 2024-01-06 LAB
ALBUMIN SERPL-MCNC: 4.2 G/DL (ref 3.2–4.8)
ALBUMIN/GLOB SERPL: 1.4 {RATIO} (ref 1–2)
ALP LIVER SERPL-CCNC: 69 U/L
ALT SERPL-CCNC: 20 U/L
ANION GAP SERPL CALC-SCNC: 6 MMOL/L (ref 0–18)
AST SERPL-CCNC: 19 U/L (ref ?–34)
BASOPHILS # BLD AUTO: 0.02 X10(3) UL (ref 0–0.2)
BASOPHILS NFR BLD AUTO: 0.4 %
BILIRUB SERPL-MCNC: 1.3 MG/DL (ref 0.3–1.2)
BUN BLD-MCNC: 10 MG/DL (ref 9–23)
BUN/CREAT SERPL: 10.9 (ref 10–20)
CALCIUM BLD-MCNC: 9.1 MG/DL (ref 8.7–10.4)
CHLORIDE SERPL-SCNC: 108 MMOL/L (ref 98–112)
CHOLEST SERPL-MCNC: 167 MG/DL (ref ?–200)
CO2 SERPL-SCNC: 25 MMOL/L (ref 21–32)
COMPLEXED PSA SERPL-MCNC: 0.15 NG/ML (ref ?–4)
CREAT BLD-MCNC: 0.92 MG/DL
DEPRECATED RDW RBC AUTO: 37.3 FL (ref 35.1–46.3)
EGFRCR SERPLBLD CKD-EPI 2021: 98 ML/MIN/1.73M2 (ref 60–?)
EOSINOPHIL # BLD AUTO: 0.14 X10(3) UL (ref 0–0.7)
EOSINOPHIL NFR BLD AUTO: 2.9 %
ERYTHROCYTE [DISTWIDTH] IN BLOOD BY AUTOMATED COUNT: 11.6 % (ref 11–15)
FASTING PATIENT LIPID ANSWER: YES
FASTING STATUS PATIENT QL REPORTED: YES
GLOBULIN PLAS-MCNC: 2.9 G/DL (ref 2.8–4.4)
GLUCOSE BLD-MCNC: 88 MG/DL (ref 70–99)
HCT VFR BLD AUTO: 42.3 %
HDLC SERPL-MCNC: 45 MG/DL (ref 40–59)
HGB BLD-MCNC: 13.6 G/DL
IMM GRANULOCYTES # BLD AUTO: 0.01 X10(3) UL (ref 0–1)
IMM GRANULOCYTES NFR BLD: 0.2 %
LDLC SERPL CALC-MCNC: 108 MG/DL (ref ?–100)
LYMPHOCYTES # BLD AUTO: 1.9 X10(3) UL (ref 1–4)
LYMPHOCYTES NFR BLD AUTO: 39.6 %
MCH RBC QN AUTO: 28.3 PG (ref 26–34)
MCHC RBC AUTO-ENTMCNC: 32.2 G/DL (ref 31–37)
MCV RBC AUTO: 87.9 FL
MONOCYTES # BLD AUTO: 0.28 X10(3) UL (ref 0.1–1)
MONOCYTES NFR BLD AUTO: 5.8 %
NEUTROPHILS # BLD AUTO: 2.45 X10 (3) UL (ref 1.5–7.7)
NEUTROPHILS # BLD AUTO: 2.45 X10(3) UL (ref 1.5–7.7)
NEUTROPHILS NFR BLD AUTO: 51.1 %
NONHDLC SERPL-MCNC: 122 MG/DL (ref ?–130)
OSMOLALITY SERPL CALC.SUM OF ELEC: 286 MOSM/KG (ref 275–295)
PLATELET # BLD AUTO: 246 10(3)UL (ref 150–450)
POTASSIUM SERPL-SCNC: 3.9 MMOL/L (ref 3.5–5.1)
PROT SERPL-MCNC: 7.1 G/DL (ref 5.7–8.2)
RBC # BLD AUTO: 4.81 X10(6)UL
SODIUM SERPL-SCNC: 139 MMOL/L (ref 136–145)
TRIGL SERPL-MCNC: 73 MG/DL (ref 30–149)
TSI SER-ACNC: 3.13 MIU/ML (ref 0.55–4.78)
VLDLC SERPL CALC-MCNC: 12 MG/DL (ref 0–30)
WBC # BLD AUTO: 4.8 X10(3) UL (ref 4–11)

## 2024-01-06 PROCEDURE — 3008F BODY MASS INDEX DOCD: CPT | Performed by: INTERNAL MEDICINE

## 2024-01-06 PROCEDURE — 90471 IMMUNIZATION ADMIN: CPT | Performed by: INTERNAL MEDICINE

## 2024-01-06 PROCEDURE — 3074F SYST BP LT 130 MM HG: CPT | Performed by: INTERNAL MEDICINE

## 2024-01-06 PROCEDURE — 99396 PREV VISIT EST AGE 40-64: CPT | Performed by: INTERNAL MEDICINE

## 2024-01-06 PROCEDURE — 90715 TDAP VACCINE 7 YRS/> IM: CPT | Performed by: INTERNAL MEDICINE

## 2024-01-06 PROCEDURE — 3078F DIAST BP <80 MM HG: CPT | Performed by: INTERNAL MEDICINE

## 2024-01-06 PROCEDURE — 36415 COLL VENOUS BLD VENIPUNCTURE: CPT | Performed by: INTERNAL MEDICINE

## 2024-01-06 NOTE — PROGRESS NOTES
Subjective:     Patient ID: Daquan Henderson is a 58 year old male.    HPI  Patient comes in today first time to establish care and for annual physical exam patient only complaint is that continues to have some left knee pain discomfort ever since he had the accident and surgery which was around February of this year he is completed physical therapy he says overall he is doing better but still with pain with activity patient is doing work with some restrictions to no more than 50 pounds.  He has not seen the orthopedic lately.  In the past prior to surgery he had a stress test which was abnormal he was referred to cardiology as per patient says cardiology cleared him do some further testing and it was okay.  Patient denies any chest pain shortness of breath    History/Other:   Review of Systems   Constitutional: Negative.    HENT: Negative.     Eyes: Negative.    Respiratory: Negative.     Cardiovascular: Negative.    Gastrointestinal: Negative.    Genitourinary: Negative.    Musculoskeletal: Negative.         Left knee /quad pain    Skin: Negative.    Neurological: Negative.    Psychiatric/Behavioral: Negative.       Current Outpatient Medications   Medication Sig Dispense Refill    atorvastatin 10 MG Oral Tab Take 1 tablet (10 mg total) by mouth nightly. 90 tablet 3    lisinopril 10 MG Oral Tab Take 1 tablet (10 mg total) by mouth daily. 90 tablet 3    Ascorbic Acid (VITAMIN C OR) Take 1 tablet by mouth.         Allergies:No Known Allergies    Past Medical History:   Diagnosis Date    High blood pressure     High cholesterol     Hyperlipidemia     Low testosterone     Obesity     ONUR (obstructive sleep apnea) PSG 7-24-16    AHI 86 RDI 86 REM AHI 66 Supine  non-supine AHI 85 [place] 68%     Sleep apnea     Visual impairment     glasses      Past Surgical History:   Procedure Laterality Date    COLONOSCOPY SCREENING - REFERRAL N/A 4/4/2023    Procedure: COLONOSCOPY-SCREENING;  Surgeon: Patrick  MD Reynold;  Location: [facility] ENDOSCOPY    OTHER SURGICAL HISTORY Left 02/23/2018    Dr. Garcia - Quad Repair      Family History   Problem Relation Age of Onset    High Blood Pressure Father     High Blood Pressure Mother       Social History:   Social History     Socioeconomic History    Marital status: Single   Tobacco Use    Smoking status: Never    Smokeless tobacco: Never   Vaping Use    Vaping Use: Never used   Substance and Sexual Activity    Alcohol use: Not Currently     Comment: occassionally    Drug use: No        Objective:   Physical Exam  Constitutional:       Appearance: He is well-developed.   HENT:      Head: Normocephalic and atraumatic.      Right Ear: External ear normal.      Left Ear: External ear normal.      Nose: Nose normal.   Eyes:      Conjunctiva/sclera: Conjunctivae normal.      Pupils: Pupils are equal, round, and reactive to light.   Cardiovascular:      Rate and Rhythm: Normal rate and regular rhythm.      Heart sounds: Normal heart sounds.   Pulmonary:      Effort: Pulmonary effort is normal.      Breath sounds: Normal breath sounds.   Abdominal:      General: Bowel sounds are normal.      Palpations: Abdomen is soft.   Genitourinary:     Penis: Normal.       Prostate: Normal.      Rectum: Normal.   Musculoskeletal:         General: Tenderness present. Normal range of motion.      Cervical back: Normal range of motion and neck supple.      Comments: Left knee /quad pain    Skin:     General: Skin is warm and dry.   Neurological:      Mental Status: He is alert and oriented to person, place, and time.      Deep Tendon Reflexes: Reflexes are normal and symmetric.         Assessment & Plan:   1. Annual physical exam exam as above we will order labs   2. Hyperlipidemia, unspecified hyperlipidemia type continue current treatment watch diet take medication we will retest    3. Rupture of left quadriceps tendon, subsequent encounter postsurgery continues to have some pain discomfort  continues to work with restriction we will give him a note till April 1 patient to follow-up back with the surgeon   4. Benign essential HTN well-controlled continue current treatment   5. Obstructive sleep apnea (adult) (pediatric) patient has a CPAP says lately he has not been using them because he thought it was bothering him we will start using it again will let us know if trouble with it   6. Abnormal stress test before surgery patient had stress test with Dr. Perez which was abnormal he was referred to cardiology per patient cardiology did some further testing and cleared him for surgery he is doing okay taking medication for cholesterol and blood pressure  Patient should be on a baby aspirin also will encourage him to start taking       Orders Placed This Encounter   Procedures    CBC With Differential With Platelet    Comp Metabolic Panel (14)    Lipid Panel    TSH W Reflex To Free T4    Urinalysis, Routine    PSA Total, Screen    TETANUS, DIPHTHERIA TOXOIDS AND ACELLULAR PERTUSIS VACCINE (TDAP), >7 YEARS, IM USE       Meds This Visit:  Requested Prescriptions      No prescriptions requested or ordered in this encounter       Imaging & Referrals:  TETANUS, DIPHTHERIA TOXOIDS AND ACELLULAR PERTUSIS VACCINE (TDAP), >7 YEARS, IM USE

## 2024-10-16 DIAGNOSIS — E78.5 HYPERLIPIDEMIA, UNSPECIFIED HYPERLIPIDEMIA TYPE: ICD-10-CM

## 2024-10-16 RX ORDER — ATORVASTATIN CALCIUM 10 MG/1
10 TABLET, FILM COATED ORAL NIGHTLY
Qty: 90 TABLET | Refills: 3 | Status: SHIPPED | OUTPATIENT
Start: 2024-10-16

## 2024-10-29 RX ORDER — LISINOPRIL 10 MG/1
10 TABLET ORAL DAILY
Qty: 90 TABLET | Refills: 3 | Status: SHIPPED | OUTPATIENT
Start: 2024-10-29

## 2025-01-16 ENCOUNTER — OFFICE VISIT (OUTPATIENT)
Dept: INTERNAL MEDICINE CLINIC | Facility: CLINIC | Age: 60
End: 2025-01-16

## 2025-01-16 ENCOUNTER — TELEPHONE (OUTPATIENT)
Dept: INTERNAL MEDICINE CLINIC | Facility: CLINIC | Age: 60
End: 2025-01-16

## 2025-01-16 VITALS
SYSTOLIC BLOOD PRESSURE: 130 MMHG | TEMPERATURE: 98 F | OXYGEN SATURATION: 99 % | BODY MASS INDEX: 42.66 KG/M2 | DIASTOLIC BLOOD PRESSURE: 80 MMHG | RESPIRATION RATE: 18 BRPM | WEIGHT: 315 LBS | HEART RATE: 59 BPM | HEIGHT: 72 IN

## 2025-01-16 DIAGNOSIS — G89.29 CHRONIC LEFT SHOULDER PAIN: ICD-10-CM

## 2025-01-16 DIAGNOSIS — E78.5 HYPERLIPIDEMIA, UNSPECIFIED HYPERLIPIDEMIA TYPE: ICD-10-CM

## 2025-01-16 DIAGNOSIS — I10 BENIGN ESSENTIAL HTN: ICD-10-CM

## 2025-01-16 DIAGNOSIS — R17 TOTAL BILIRUBIN, ELEVATED: ICD-10-CM

## 2025-01-16 DIAGNOSIS — S76.112D RUPTURE OF LEFT QUADRICEPS MUSCLE, SUBSEQUENT ENCOUNTER: ICD-10-CM

## 2025-01-16 DIAGNOSIS — M25.512 CHRONIC LEFT SHOULDER PAIN: ICD-10-CM

## 2025-01-16 DIAGNOSIS — R35.1 NOCTURIA: ICD-10-CM

## 2025-01-16 DIAGNOSIS — G47.33 OBSTRUCTIVE SLEEP APNEA (ADULT) (PEDIATRIC): ICD-10-CM

## 2025-01-16 DIAGNOSIS — Z00.00 ANNUAL PHYSICAL EXAM: Primary | ICD-10-CM

## 2025-01-16 LAB
ALBUMIN SERPL-MCNC: 4.8 G/DL (ref 3.2–4.8)
ALBUMIN/GLOB SERPL: 1.8 {RATIO} (ref 1–2)
ALP LIVER SERPL-CCNC: 69 U/L
ALT SERPL-CCNC: 30 U/L
ANION GAP SERPL CALC-SCNC: 11 MMOL/L (ref 0–18)
AST SERPL-CCNC: 30 U/L (ref ?–34)
BASOPHILS # BLD AUTO: 0.03 X10(3) UL (ref 0–0.2)
BASOPHILS NFR BLD AUTO: 0.5 %
BILIRUB SERPL-MCNC: 1.4 MG/DL (ref 0.3–1.2)
BUN BLD-MCNC: 14 MG/DL (ref 9–23)
BUN/CREAT SERPL: 14.3 (ref 10–20)
CALCIUM BLD-MCNC: 9.9 MG/DL (ref 8.7–10.4)
CHLORIDE SERPL-SCNC: 106 MMOL/L (ref 98–112)
CHOLEST SERPL-MCNC: 181 MG/DL (ref ?–200)
CO2 SERPL-SCNC: 26 MMOL/L (ref 21–32)
COMPLEXED PSA SERPL-MCNC: 0.13 NG/ML (ref ?–4)
CREAT BLD-MCNC: 0.98 MG/DL
DEPRECATED RDW RBC AUTO: 37.9 FL (ref 35.1–46.3)
EGFRCR SERPLBLD CKD-EPI 2021: 90 ML/MIN/1.73M2 (ref 60–?)
EOSINOPHIL # BLD AUTO: 0.2 X10(3) UL (ref 0–0.7)
EOSINOPHIL NFR BLD AUTO: 3.3 %
ERYTHROCYTE [DISTWIDTH] IN BLOOD BY AUTOMATED COUNT: 11.9 % (ref 11–15)
FASTING PATIENT LIPID ANSWER: YES
FASTING STATUS PATIENT QL REPORTED: YES
GLOBULIN PLAS-MCNC: 2.7 G/DL (ref 2–3.5)
GLUCOSE BLD-MCNC: 70 MG/DL (ref 70–99)
HCT VFR BLD AUTO: 41.4 %
HDLC SERPL-MCNC: 48 MG/DL (ref 40–59)
HGB BLD-MCNC: 13.2 G/DL
IMM GRANULOCYTES # BLD AUTO: 0.01 X10(3) UL (ref 0–1)
IMM GRANULOCYTES NFR BLD: 0.2 %
LDLC SERPL CALC-MCNC: 121 MG/DL (ref ?–100)
LYMPHOCYTES # BLD AUTO: 2.64 X10(3) UL (ref 1–4)
LYMPHOCYTES NFR BLD AUTO: 43.6 %
MCH RBC QN AUTO: 28.2 PG (ref 26–34)
MCHC RBC AUTO-ENTMCNC: 31.9 G/DL (ref 31–37)
MCV RBC AUTO: 88.5 FL
MONOCYTES # BLD AUTO: 0.46 X10(3) UL (ref 0.1–1)
MONOCYTES NFR BLD AUTO: 7.6 %
NEUTROPHILS # BLD AUTO: 2.72 X10 (3) UL (ref 1.5–7.7)
NEUTROPHILS # BLD AUTO: 2.72 X10(3) UL (ref 1.5–7.7)
NEUTROPHILS NFR BLD AUTO: 44.8 %
NONHDLC SERPL-MCNC: 133 MG/DL (ref ?–130)
OSMOLALITY SERPL CALC.SUM OF ELEC: 295 MOSM/KG (ref 275–295)
PLATELET # BLD AUTO: 252 10(3)UL (ref 150–450)
POTASSIUM SERPL-SCNC: 4 MMOL/L (ref 3.5–5.1)
PROT SERPL-MCNC: 7.5 G/DL (ref 5.7–8.2)
RBC # BLD AUTO: 4.68 X10(6)UL
SODIUM SERPL-SCNC: 143 MMOL/L (ref 136–145)
TRIGL SERPL-MCNC: 65 MG/DL (ref 30–149)
TSI SER-ACNC: 3.94 UIU/ML (ref 0.55–4.78)
VLDLC SERPL CALC-MCNC: 11 MG/DL (ref 0–30)
WBC # BLD AUTO: 6.1 X10(3) UL (ref 4–11)

## 2025-01-16 PROCEDURE — 90656 IIV3 VACC NO PRSV 0.5 ML IM: CPT | Performed by: INTERNAL MEDICINE

## 2025-01-16 PROCEDURE — 99396 PREV VISIT EST AGE 40-64: CPT | Performed by: INTERNAL MEDICINE

## 2025-01-16 PROCEDURE — 90471 IMMUNIZATION ADMIN: CPT | Performed by: INTERNAL MEDICINE

## 2025-01-16 NOTE — TELEPHONE ENCOUNTER
Patient calling,verified name and date of birth.    Patient misplaced his after visit summary and would like a copy mailed to his home address.  Patient asks how to schedule xray, transferred to Central scheduling.

## 2025-01-16 NOTE — PROGRESS NOTES
Subjective:     Patient ID: Daquan Henderson is a 59 year old male.    Patient comes in today for annual physical complains of left shoulder pain as per patient has been going on for few months now on and off but most days yes to pain no injury or fall that he remembers on the shoulder.  Patient continues to have trouble with pain with the left leg after quadricep longus rupture he had surgery he has a follow-up appointment with ortho we will.  Patient also complains of having to get up few times at night to urinate but does admit that he drinks water before bed may be too much        History/Other:   Review of Systems   Constitutional: Negative.  Negative for fatigue and fever.   HENT: Negative.  Negative for congestion.    Eyes: Negative.    Respiratory: Negative.  Negative for cough, shortness of breath and wheezing.    Cardiovascular: Negative.  Negative for chest pain, palpitations and leg swelling.   Gastrointestinal: Negative.    Endocrine: Negative for cold intolerance and heat intolerance.   Genitourinary: Negative.  Negative for dysuria, flank pain and hematuria.   Musculoskeletal:  Positive for arthralgias. Negative for back pain and myalgias.        Left shoulder pain left leg pain   Skin: Negative.    Neurological: Negative.  Negative for dizziness, tremors, syncope, weakness and headaches.   Psychiatric/Behavioral: Negative.  Negative for agitation, behavioral problems and suicidal ideas. The patient is not nervous/anxious.      Current Outpatient Medications   Medication Sig Dispense Refill    lisinopril 10 MG Oral Tab Take 1 tablet (10 mg total) by mouth daily. 90 tablet 3    atorvastatin 10 MG Oral Tab Take 1 tablet (10 mg total) by mouth nightly. 90 tablet 3    Ascorbic Acid (VITAMIN C OR) Take 1 tablet by mouth.         Allergies:Allergies[1]    Past Medical History:    High blood pressure    High cholesterol    Hyperlipidemia    Low testosterone    Obesity    ONUR (obstructive sleep apnea)     AHI 86 RDI 86 REM AHI 66 Supine  non-supine AHI 85 [place] 68%     Sleep apnea    Visual impairment    glasses      Past Surgical History:   Procedure Laterality Date    Colonoscopy screening - referral N/A 4/4/2023    Procedure: COLONOSCOPY-SCREENING;  Surgeon: Reynold Nguyen MD;  Location: [facility] ENDOSCOPY    Other surgical history Left 02/23/2018    Dr. Garcia - Quad Repair      Family History   Problem Relation Age of Onset    High Blood Pressure Father     High Blood Pressure Mother       Social History:   Social History     Socioeconomic History    Marital status: Single   Tobacco Use    Smoking status: Never     Passive exposure: Never    Smokeless tobacco: Never   Vaping Use    Vaping status: Never Used   Substance and Sexual Activity    Alcohol use: Not Currently     Comment: occassionally    Drug use: No        Objective:   Physical Exam  Vitals and nursing note reviewed.   Constitutional:       Appearance: He is well-developed.   HENT:      Head: Normocephalic and atraumatic.      Right Ear: External ear normal.      Left Ear: External ear normal.      Nose: Nose normal.   Eyes:      Conjunctiva/sclera: Conjunctivae normal.      Pupils: Pupils are equal, round, and reactive to light.   Cardiovascular:      Rate and Rhythm: Normal rate and regular rhythm.      Heart sounds: Normal heart sounds.   Pulmonary:      Effort: Pulmonary effort is normal.      Breath sounds: Normal breath sounds.   Abdominal:      General: Bowel sounds are normal.      Palpations: Abdomen is soft.   Genitourinary:     Penis: Normal.       Prostate: Normal.      Rectum: Normal.   Musculoskeletal:         General: Tenderness present. Normal range of motion.      Cervical back: Normal range of motion and neck supple.      Comments: Left shoulder pain    Left leg/thigh pain   Skin:     General: Skin is warm and dry.   Neurological:      Mental Status: He is alert and oriented to person, place, and time.      Deep Tendon  Reflexes: Reflexes are normal and symmetric.         Assessment & Plan:       1. Annual physical exam exam as above we will order labs   2. Hyperlipidemia, unspecified hyperlipidemia type continue current treatment we will retest    3. Obstructive sleep apnea (adult) (pediatric) use CPAP   4. Benign essential HTN continue current treatment   5. Chronic left shoulder pain will get an x-ray will refer to physiatry   6. Nocturia -cut down Fluid Intake At Least 2 Hours before Bed Can Have a Slip   If Symptoms Persist Let Us Know   7. Rupture of left quadriceps muscle, subsequent encounter -continues to have pain follow-up with Ortho       Orders Placed This Encounter   Procedures    Fluzone trivalent vaccine, PF 0.5mL, 6mo+ (29078)       Meds This Visit:  Requested Prescriptions      No prescriptions requested or ordered in this encounter       Imaging & Referrals:  INFLUENZA VACCINE, TRI, PRESERV FREE, 0.5 ML  PHYSIATRY - INTERNAL  XR SHOULDER, COMPLETE (MIN 2 VIEWS), LEFT (CPT=73030)            [1] No Known Allergies

## 2025-01-18 DIAGNOSIS — E78.5 HYPERLIPIDEMIA, UNSPECIFIED HYPERLIPIDEMIA TYPE: ICD-10-CM

## 2025-01-18 DIAGNOSIS — R17 TOTAL BILIRUBIN, ELEVATED: Primary | ICD-10-CM

## 2025-01-18 DIAGNOSIS — R17 ELEVATED BILIRUBIN: Primary | ICD-10-CM

## 2025-01-18 LAB — BILIRUB DIRECT SERPL-MCNC: 0.4 MG/DL (ref ?–0.3)

## 2025-01-18 RX ORDER — ATORVASTATIN CALCIUM 20 MG/1
20 TABLET, FILM COATED ORAL NIGHTLY
Qty: 90 TABLET | Refills: 1 | Status: SHIPPED | OUTPATIENT
Start: 2025-01-18

## 2025-02-22 ENCOUNTER — HOSPITAL ENCOUNTER (OUTPATIENT)
Dept: ULTRASOUND IMAGING | Facility: HOSPITAL | Age: 60
Discharge: HOME | End: 2025-02-22
Attending: INTERNAL MEDICINE
Payer: MEDICAID

## 2025-02-22 ENCOUNTER — HOSPITAL ENCOUNTER (OUTPATIENT)
Dept: GENERAL RADIOLOGY | Facility: HOSPITAL | Age: 60
Discharge: HOME | End: 2025-02-22
Attending: INTERNAL MEDICINE
Payer: MEDICAID

## 2025-02-22 DIAGNOSIS — R17 ELEVATED BILIRUBIN: ICD-10-CM

## 2025-02-22 DIAGNOSIS — M25.512 CHRONIC LEFT SHOULDER PAIN: ICD-10-CM

## 2025-02-22 DIAGNOSIS — G89.29 CHRONIC LEFT SHOULDER PAIN: ICD-10-CM

## 2025-02-22 PROCEDURE — 73030 X-RAY EXAM OF SHOULDER: CPT | Performed by: INTERNAL MEDICINE

## 2025-02-22 PROCEDURE — 76705 ECHO EXAM OF ABDOMEN: CPT | Performed by: INTERNAL MEDICINE

## 2025-04-08 ENCOUNTER — TELEPHONE (OUTPATIENT)
Dept: PHYSICAL MEDICINE AND REHAB | Facility: CLINIC | Age: 60
End: 2025-04-08

## 2025-04-28 ENCOUNTER — OFFICE VISIT (OUTPATIENT)
Facility: CLINIC | Age: 60
End: 2025-04-28

## 2025-04-28 VITALS
HEART RATE: 65 BPM | DIASTOLIC BLOOD PRESSURE: 72 MMHG | HEIGHT: 73 IN | BODY MASS INDEX: 41.75 KG/M2 | SYSTOLIC BLOOD PRESSURE: 136 MMHG | WEIGHT: 315 LBS

## 2025-04-28 DIAGNOSIS — K76.0 FATTY LIVER: ICD-10-CM

## 2025-04-28 DIAGNOSIS — R17 ELEVATED BILIRUBIN: Primary | ICD-10-CM

## 2025-04-28 PROCEDURE — 99213 OFFICE O/P EST LOW 20 MIN: CPT | Performed by: INTERNAL MEDICINE

## 2025-04-28 NOTE — PATIENT INSTRUCTIONS
1.  Advise weight loss.  2.  Repeat blood work at your convenience.  3.  Liver ultrasound/elastography later this year.

## 2025-04-28 NOTE — PROGRESS NOTES
Subjective:   Patient ID: Daquan Henderson is a 59 year old male.    HPI  Mr. Henderson returns in follow-up.  He was last seen at colonoscopy in April 2023.    As per previous notes the patient underwent a screening colonoscopy on April 4, 2023.  He was found to have a solitary subcentimeter tubular adenoma.  A 7-year surveillance colonoscopy was advised (April 2030).    The patient underwent recent routine laboratory testing.  He was found to have a bilirubin of 1.4.  Liver chemistries were normal.  An ultrasound revealed increased echogenicity suggestive of fatty infiltration/intrinsic liver disease and hepatomegaly.  He is referred for evaluation.    The patient is asymptomatic.  He has never been told of previous liver issues.  His appetite is good.  He knows that he should be more active and needs to lose weight.  He denies nausea, vomiting, dysphagia, odynophagia or heartburn.  He has no abdominal pain.  His bowel movements are regular without recent change.  There is no history of melena or hematochezia.    The patient denies pruritus, dark urine or episodes of jaundice.    Medications are as listed.  Occasional multivitamins.  No other OTC products.    The patient does not drink excessive amounts of alcohol.      History/Other:   Review of Systems  See above    Wt Readings from Last 7 Encounters:   04/29/25 (!) 336 lb (152.4 kg)   04/28/25 (!) 336 lb (152.4 kg)   01/16/25 (!) 330 lb (149.7 kg)   01/06/24 (!) 335 lb (152 kg)   07/05/23 (!) 330 lb 4 oz (149.8 kg)   03/27/23 (!) 324 lb (147 kg)   02/21/23 (!) 334 lb (151.5 kg)     Body mass index is 44.33 kg/m².    Current Medications[1]  Allergies:Allergies[2]    Objective:   Physical Exam  Vitals and nursing note reviewed.   Constitutional:       General: He is not in acute distress.     Appearance: He is well-developed. He is obese. He is not ill-appearing, toxic-appearing or diaphoretic.   HENT:      Mouth/Throat:      Pharynx: No oropharyngeal exudate.    Eyes:      General: No scleral icterus.     Conjunctiva/sclera: Conjunctivae normal.   Neck:      Thyroid: No thyromegaly.   Cardiovascular:      Rate and Rhythm: Normal rate and regular rhythm.      Heart sounds: Normal heart sounds. No murmur heard.  Pulmonary:      Effort: Pulmonary effort is normal. No respiratory distress.      Breath sounds: Normal breath sounds. No wheezing or rales.   Abdominal:      General: Bowel sounds are normal. There is no distension.      Palpations: Abdomen is soft. There is no mass.      Tenderness: There is no abdominal tenderness. There is no guarding or rebound.   Musculoskeletal:      Cervical back: Neck supple.   Lymphadenopathy:      Cervical: No cervical adenopathy.   Neurological:      Mental Status: He is alert and oriented to person, place, and time.   Psychiatric:         Behavior: Behavior normal.         Component      Latest Ref Rng 7/24/2020 9/7/2021 6/23/2022 10/11/2022 1/23/2023   Glucose      70 - 99 mg/dL 87  81   84     Sodium      136 - 145 mmol/L 140  140   139     Potassium      3.5 - 5.1 mmol/L 4.1  3.7   4.0     Chloride      98 - 112 mmol/L 106  106   107     Carbon Dioxide, Total      21.0 - 32.0 mmol/L 27.0  24.0   25.0     ANION GAP      0 - 18 mmol/L 7  10   7     BUN      9 - 23 mg/dL 20 (H)  17   14     CREATININE      0.70 - 1.30 mg/dL 0.95  0.86   0.89     BUN/CREATININE RATIO      10.0 - 20.0  21.1 (H)  19.8   15.7     CALCIUM      8.7 - 10.4 mg/dL 9.1  9.6   8.7     CALCULATED OSMOLALITY      275 - 295 mOsm/kg 292  291   288     ALT (SGPT)      10 - 49 U/L 32  33  34  33     AST (SGOT)      <34 U/L 18  17  21  21     ALKALINE PHOSPHATASE      45 - 117 U/L 76  70  66  71     Total Bilirubin      0.3 - 1.2 mg/dL 0.8  1.3  0.9  1.0     PROTEIN, TOTAL      5.7 - 8.2 g/dL 7.5  7.5  7.1  7.3     Albumin      3.2 - 4.8 g/dL 3.9  4.1  3.7  3.8     Globulin      2.0 - 3.5 g/dL 3.6  3.4   3.5     A/G Ratio      1.0 - 2.0  1.1  1.2   1.1     EGFR      >=60  mL/min/1.73m2    101  101    Patient Fasting for CMP?    No       Component      Latest Ref Rng 1/6/2024 1/16/2025   Glucose      70 - 99 mg/dL 88  70    Sodium      136 - 145 mmol/L 139  143    Potassium      3.5 - 5.1 mmol/L 3.9  4.0    Chloride      98 - 112 mmol/L 108  106    Carbon Dioxide, Total      21.0 - 32.0 mmol/L 25.0  26.0    ANION GAP      0 - 18 mmol/L 6  11    BUN      9 - 23 mg/dL 10  14    CREATININE      0.70 - 1.30 mg/dL 0.92  0.98    BUN/CREATININE RATIO      10.0 - 20.0  10.9  14.3    CALCIUM      8.7 - 10.4 mg/dL 9.1  9.9    CALCULATED OSMOLALITY      275 - 295 mOsm/kg 286  295    ALT (SGPT)      10 - 49 U/L 20  30    AST (SGOT)      <34 U/L 19  30    ALKALINE PHOSPHATASE      45 - 117 U/L 69  69    Total Bilirubin      0.3 - 1.2 mg/dL 1.3 (H)  1.4 (H)    PROTEIN, TOTAL      5.7 - 8.2 g/dL 7.1  7.5    Albumin      3.2 - 4.8 g/dL 4.2  4.8    Globulin      2.0 - 3.5 g/dL 2.9  2.7    A/G Ratio      1.0 - 2.0  1.4  1.8    EGFR      >=60 mL/min/1.73m2 98  90    Patient Fasting for CMP? Yes  Yes       Legend:  (H) High    Component  Ref Range & Units (hover) 10/11/22  4:06 PM   WBC 6.3   RBC 4.82   HGB 13.6   HCT 42.7   MCV 88.6   MCH 28.2   MCHC 31.9   RDW-SD 37.4   RDW 11.7   .0   Neutrophil Absolute Prelim 3.15   Neutrophil Absolute 3.15   Lymphocyte Absolute 2.46   Monocyte Absolute 0.52   Eosinophil Absolute 0.18   Basophil Absolute 0.02   Immature Granulocyte Absolute 0.01   Neutrophil % 49.7   Lymphocyte % 38.8   Monocyte % 8.2   Eosinophil % 2.8   Basophil % 0.3   Immature Granulocyte % 0.2   Resulting Agency [facility] ([facility])                 PROCEDURE: US ABDOMEN LIMITED (CPT=76705)     COMPARISON:   None.     INDICATIONS:   Elevated bilirubin     TECHNIQUE:   Limited evaluation of the areas indicated in the order with gray scale and colorflow of the main vessels where appropriate.  Areas scanned:  Right upper quadrant.     FINDINGS:  LIVER:   The liver is minimally enlarged in size  measuring up to 17 cm. It demonstrates slight increased echogenicity and heterogeneity compatible with mild fatty infiltration/intrinsic liver disease.  There is normal directional portal and hepatic  venous blood flow.  GALLBLADDER:   Normal.  Normal size.  No calculi, wall thickening or pericholecystic fluid.  Sonographic Rodriguez's sign was not elicited.  BILE DUCTS:   Normal.  Common bile duct measures 3.3 mm.    PANCREAS:   Limited views secondary to overlying bowel gas.  OTHER:   Negative.       CONCLUSION:     1. Slight increased echogenicity and heterogeneity of a minimally enlarged liver compatible with fatty infiltration/intrinsic liver disease.  There is normal directional portal and hepatic venous blood flow.          2. NORMAL SONOGRAPHIC APPEARANCE OF THE GALLBLADDER AND RIGHT KIDNEY.          DICTATED BY (CST): SAADIA LEE MD ON 2/26/2025 AT 11:03 AM      FINALIZED BY (CST): SAADIA LEE MD ON 2/26/2025 AT 11:05 AM                    Result History    Assessment & Plan:   1. Elevated bilirubin    2. Fatty liver    In the course of routine laboratory testing the patient was found to have a mildly elevated total bilirubin.  The predominance of the bilirubin is indirect.  Liver chemistries and CBC are normal.  Increased echogenicity and hepatomegaly are noted on ultrasound.  Although the patient most certainly has metabolic dysfunction-associated steatotic liver disease in light of his elevated BMI, the elevated bilirubin is likely due to Gilbert's syndrome and is of no clinical consequence.  I doubt cirrhosis.  In light of the ultrasound findings, however, I am recommending laboratory testing to exclude other causes of liver disease and consideration for elastography later this year.  I have discussed the importance of weight loss either with diet/exercise or pharmacologically especially if elastography reveals fibrosis.  Evaluation by a tertiary care hepatologist for resmetirom would also be  considered if the patient has F2-F3 fibrosis.        Orders Placed This Encounter   Procedures    Actin (Smooth Muscle) Antibody    Ferritin    Hepatitis A B + C profile    Iron And Tibc    Mitochondrial (M2) Antibody    CBC With Differential With Platelet    Tissue Transglutaminase Ab, IgA    Immunoglobulin A/G/M, Quant    Hepatic Function Panel (7)    Haptoglobin       Meds This Visit:  Requested Prescriptions      No prescriptions requested or ordered in this encounter       Imaging & Referrals:  None         [1]   Current Outpatient Medications   Medication Sig Dispense Refill    atorvastatin 20 MG Oral Tab Take 1 tablet (20 mg total) by mouth nightly. 90 tablet 1    lisinopril 10 MG Oral Tab Take 1 tablet (10 mg total) by mouth daily. 90 tablet 3    Ascorbic Acid (VITAMIN C OR) Take 1 tablet by mouth.       [2] No Known Allergies

## 2025-04-29 ENCOUNTER — TELEPHONE (OUTPATIENT)
Dept: PHYSICAL MEDICINE AND REHAB | Facility: CLINIC | Age: 60
End: 2025-04-29

## 2025-04-29 ENCOUNTER — OFFICE VISIT (OUTPATIENT)
Dept: PHYSICAL MEDICINE AND REHAB | Facility: CLINIC | Age: 60
End: 2025-04-29
Payer: MEDICAID

## 2025-04-29 VITALS
WEIGHT: 315 LBS | DIASTOLIC BLOOD PRESSURE: 76 MMHG | OXYGEN SATURATION: 94 % | SYSTOLIC BLOOD PRESSURE: 144 MMHG | HEIGHT: 72 IN | BODY MASS INDEX: 42.66 KG/M2 | HEART RATE: 64 BPM

## 2025-04-29 DIAGNOSIS — M75.02 ADHESIVE CAPSULITIS OF LEFT SHOULDER: ICD-10-CM

## 2025-04-29 DIAGNOSIS — M19.012 ARTHRITIS OF LEFT SHOULDER: Primary | ICD-10-CM

## 2025-04-29 RX ORDER — LIDOCAINE HYDROCHLORIDE 10 MG/ML
3 INJECTION, SOLUTION INFILTRATION; PERINEURAL ONCE
Status: COMPLETED | OUTPATIENT
Start: 2025-04-29 | End: 2025-04-29

## 2025-04-29 RX ORDER — LIDOCAINE HYDROCHLORIDE 10 MG/ML
4 INJECTION, SOLUTION INFILTRATION; PERINEURAL ONCE
Status: COMPLETED | OUTPATIENT
Start: 2025-04-29 | End: 2025-04-29

## 2025-04-29 RX ORDER — TRIAMCINOLONE ACETONIDE 40 MG/ML
40 INJECTION, SUSPENSION INTRA-ARTICULAR; INTRAMUSCULAR ONCE
Status: COMPLETED | OUTPATIENT
Start: 2025-04-29 | End: 2025-04-29

## 2025-04-29 RX ORDER — BUPIVACAINE HYDROCHLORIDE 2.5 MG/ML
4 INJECTION, SOLUTION INFILTRATION; PERINEURAL ONCE
Status: COMPLETED | OUTPATIENT
Start: 2025-04-29 | End: 2025-04-29

## 2025-04-29 NOTE — TELEPHONE ENCOUNTER
Initiated authorization for Left shoulder glenohumeral joint injection, ultrasound guidance. CPT/HCPCS 53419,  dx:M19.012 with [employer] portal.  Completed in the office today.    Status: No action required  Reference/Authorization # [id number]    Authorization is not required based on medical necessity, however, is not a guarantee of payment and may be subject to review once claim is submitted.

## 2025-04-29 NOTE — PROGRESS NOTES
NEW PATIENT VISIT    CHIEF COMPLAINT  Left shoulder pain      HISTORY OF PRESENTING ILLNESS  Daquan Henderson is a 59 year old male who presents for evaluation of Left shoulder pain.  Patient describes pain in his left shoulder with decreased range of motion, good strength but pain with an aching sensation with end ranges of motion.  Especially with internal and external rotation and overhead reach.    Patient states that he got some vaccinations around 8 9 months ago and he feels that this was near the onset of his symptoms.    Presented to his primary care physician and had an x-ray of his left shoulder which revealed severe osteoarthritic change.    Patient denies any dropping of his arm or radiation of symptoms distally.  Denies any associated neck pain or other red flags.    History of Present Illness        No chief complaint on file.       PAST MEDICAL HISTORY  Past Medical History[1]    PAST SURGICAL HISTORY  Past Surgical History[2]    MEDICATIONS  Medications Ordered Prior to Encounter[3]    ALLERGIES  Allergies[4]    SOCIAL HISTORY   reports that he has never smoked. He has never been exposed to tobacco smoke. He has never used smokeless tobacco. He reports that he does not currently use alcohol. He reports that he does not use drugs.    FAMILY HISTORY  Family History[5]    REVIEW OF SYSTEMS  Complete review of systems was performed and was negative except for those items stated in the History of Presenting Illness and Past Medical/Surgical History.    PHYSICAL EXAMINATION  GENERAL:  In no acute distress. Well-developed and well nourished.   SKIN: No rashes or open wounds involving bilateral upper extremities and neck.  NEUROLOGIC:   Strength: 5/5 bilaterally with shoulder abduction, external rotation, internal rotation, elbow flexion, elbow extension, wrist extension, FDI, ADM, EIP and APB.   Sensation: intact light touch sensation throughout bilateral upper extremities.   Reflexes: intact and  symmetric in bilateral upper extremities. Bates’s negative.   MUSCULOSKELETAL:  Physical Exam      Patient with decreased range of motion of the left shoulder in all planes compared to the right shoulder with pain at end ranges of motion.  Empty can testing, resisted internal and external rotation are all negative for pain indicating solid integrity of his rotator cuff on the left side.    REVIEW OF PRIOR X-RAYS/STUDIES  Independent reviewed the x-rays of the left shoulder dated 2/22/2025 which reveal severe degenerative changes with osteoarthritis of the left shoulder.    IMPRESSION/DIAGNOSIS  Encounter Diagnoses   Name Primary?    Arthritis of left shoulder Yes    Adhesive capsulitis of left shoulder        TREATMENT/PLAN  Assessment & Plan    Will plan for glenohumeral joint injection under ultrasound guidance today and then follow-up with physical therapy.  Follow-up with me after physical therapy is completed.    Education was provided regarding the above impression/diagnosis and treatment options/plan were discussed.  All questions were answered during today's visit.  Patient will contact clinic if any other questions or concerns.            Ozzy Guerra DO  Interventional Spine and Sports Medicine Specialist   Physical Medicine and Rehabilitation  [address] [address]    [facility]  [address] [place] [address]                 [1]   Past Medical History:   High blood pressure    High cholesterol    Hyperlipidemia    Low testosterone    Obesity    ONUR (obstructive sleep apnea)    AHI 86 RDI 86 REM AHI 66 Supine  non-supine AHI 85 [place] 68%     Sleep apnea    Visual impairment    glasses   [2]   Past Surgical History:  Procedure Laterality Date    Colonoscopy screening - referral N/A 4/4/2023    Procedure: COLONOSCOPY-SCREENING;  Surgeon: Reynold Nguyen MD;  Location: [facility] ENDOSCOPY    Other surgical  history Left 02/23/2018    Dr. Garcia - Quad Repair   [3]   Current Outpatient Medications on File Prior to Visit   Medication Sig Dispense Refill    atorvastatin 20 MG Oral Tab Take 1 tablet (20 mg total) by mouth nightly. 90 tablet 1    lisinopril 10 MG Oral Tab Take 1 tablet (10 mg total) by mouth daily. 90 tablet 3    Ascorbic Acid (VITAMIN C OR) Take 1 tablet by mouth.         No current facility-administered medications on file prior to visit.   [4] No Known Allergies  [5]   Family History  Problem Relation Age of Onset    High Blood Pressure Father     High Blood Pressure Mother

## 2025-04-29 NOTE — PROGRESS NOTES
The following individual(s) verbally consented to be recorded using ambient AI listening technology and understand that they can each withdraw their consent to this listening technology at any point by asking the clinician to turn off or pause the recording:    Patient name: Daquan Henderson  Additional names:

## 2025-04-29 NOTE — PROCEDURES
[facility]   Shoulder Injection Procedure Note    CHIEF COMPLAINT:    Left shoulder pain range of motion decreased  PROCEDURE PERFORMED: Left glenohumeral joint corticosteroid injection under ultrasound guidance    INDICATIONS: Left shoulder pain left adhesive capsulitis.    PRIMARY PROCEDURALIST:  Ozzy Guerra DO    INFORMED CONSENT & TIME OUT:   As documented in the Time Out and Pre-Procedure Check Lists.  Written consent was obtained    PROCEDURE:    The risks and benefits of intraarticular corticosteroid injection were again explained to the patient and verbal consent was obtained. The left shoulder was prepped and draped in the usual sterile fashion using sterile gel, sterile gloves etc. Using a curvilinear ultrasound transducer, the left glenohumeral joint was identified. A 27 G 1.5-inch needle was used to anesthetize the skin and needle was introduced toward the left glenohumeral joint while injecting 3 cc of 1% lidocaine under ultrasound guidance. Then a 22 G 2in needle was advanced until seen in the glenohumeral joint. Aspiration was attempted and there was no fluid able to be aspirated. We switched the syringe to one containing 4 mL of 1% lidocaine, 4ml of 0.25% bupivacaine and 1 mL of 40 mg/mL Kenalog and it was injected.  The needle was then removed and hemostasis was obtained.  The skin site was cleansed and a clean dressing was applied.  The patient tolerated the procedure well.       Patient verbalized understanding of assessment and plan.  Patient is in agreement with the plan.  All questions were answered.  No barriers to learning identified. Permanent pictures were saved.           Ozzy Guerra DO  Interventional Spine and Sports Medicine Specialist   Physical Medicine and Rehabilitation  [address] [address]    [address] [place], [place]  [address]

## 2025-05-28 ENCOUNTER — TELEPHONE (OUTPATIENT)
Facility: CLINIC | Age: 60
End: 2025-05-28

## 2025-05-28 NOTE — TELEPHONE ENCOUNTER
1ST   Letter sent out to patient's home       LABS    Actin (Smooth Muscle) Antibody  CBC With Differential With Platelet  Ferritin  Haptoglobin  Hepatic Function Panel (7)  Hepatitis A B + C profile  Immunoglobulin A/G/M, Quant  Iron And Tibc  Mitochondrial (M2) Antibody  Tissue Transglutaminase Ab, IgA

## 2025-06-11 ENCOUNTER — OFFICE VISIT (OUTPATIENT)
Dept: PHYSICAL THERAPY | Age: 60
End: 2025-06-11
Attending: PHYSICAL MEDICINE & REHABILITATION
Payer: MEDICAID

## 2025-06-11 DIAGNOSIS — M19.012 ARTHRITIS OF LEFT SHOULDER: Primary | ICD-10-CM

## 2025-06-11 DIAGNOSIS — M75.02 ADHESIVE CAPSULITIS OF LEFT SHOULDER: ICD-10-CM

## 2025-06-11 PROCEDURE — 97110 THERAPEUTIC EXERCISES: CPT

## 2025-06-11 PROCEDURE — 97161 PT EVAL LOW COMPLEX 20 MIN: CPT

## 2025-06-11 NOTE — PROGRESS NOTES
UPPER EXTREMITY EVALUATION:     Diagnosis:   Arthritis of left shoulder (M19.012)  Adhesive capsulitis of left shoulder (M75.02) Patient:  Daquan Henderson (59 year old, male)        Referring Provider: Ozzy Guerra  Today's Date   6/11/2025    Precautions:  None   Date of Evaluation: 06/11/25  Next MD visit: No data recorded  Date of Surgery: No data recorded     PATIENT SUMMARY     Summary of chief complaints: left shoulder pain  History of current condition: Patient states that his shoulder pain began 2-3 years ago. Patient denies any mechanism of injury. Patient states that he had a corticosteroid injection about 4 weeks ago which was helpful for pain and stiffness.   Pain level: current 4 /10, at best 4 /10, at worst 9 /10  Description of symptoms: sore, stiff, tight   Aggravating factors: sitting still   Easing factors: \"being up\", not sitting still  Irritability: low   Occupation: delivery     Prior level of function: IND with ADLs and IADLs  Current limitations: putting on seatbelt, reaching behind the back  Pt goals: pain relief and strengthening  Hand Dominance: right   Past medical history was reviewed with Daquan.  Significant findings include: quadriceps repair  Imaging/Tests: see chart   Daquan  has a past medical history of High blood pressure, High cholesterol, Hyperlipidemia, Low testosterone, Obesity, ONUR (obstructive sleep apnea) (PSG 7-24-16), Sleep apnea, and Visual impairment.  He  has a past surgical history that includes other surgical history (Left, 02/23/2018) and colonoscopy screening - referral (N/A, 4/4/2023).  Medications Ordered Prior to Encounter[1]     ASSESSMENT  Daquan presents to physical therapy evaluation with primary c/o left shoulder pain. The results of the objective tests and measures show limited shoulder active range of motion, painful, hypomobile glenohumeral joint and cervical spine passive accessory range of motion, bilateral shoulder external rotation weakness.  Functional deficits include but are not limited to putting on seatbelt, reaching behind the back. Signs and symptoms are consistent with diagnosis of Arthritis of left shoulder (M19.012)  Adhesive capsulitis of left shoulder (M75.02). Pt and PT discussed evaluation findings, pathology, POC and HEP.  Pt voiced understanding and performs HEP correctly without reported pain. Skilled Physical Therapy is medically necessary to address the above impairments and reach functional goals.    OBJECTIVE:      Musculoskeletal  Vitals: VSS  Observation/Posture: forward head posture; increased thoracic kyphosis; shoulder internal rotation  Palpation: pain free rotator cuff repair tendon insertion   Accessory motion: AP and inferior glide hypomobile R/L , L p*; cervical upglides hypomobile R/L, downglides hypomobile R/L, p* shoulder lower cervical  Shoulder passive physiological ROM: R/L all directions limited      ROM and Strength (* denotes performed with pain)  Shoulder   ROM MMT (-/5)    R L R L     Flex limited limited         Ext             Abd (C5) limited limited         IR HBB limited HBB limited 5/5 5/5     ER 30 deg, limited HBH 20 deg, limited HBH 3+/5 4-/5     Low Trap n/a         Mid Trap n/a         SA n/a            Today's Treatment and Response:     Today's Treatment       6/11/2025   UE Treatment   Therapeutic Exercise - Pt education was provided on exam findings, treatment diagnosis, treatment plan, expectations, and prognosis.  - ER stretch with dowel   Therapeutic Exercise Minutes 8   Evaluation Minutes 37   Total Time Of Timed Procedures 8   Total Time Of Service-Based Procedures 37   Total Treatment Time 45   HEP ER stretch with dowel 3x30s, 1-3x/day, daily         Patient was instructed in and issued a HEP for: ER stretch with dowel 3x30s, 1-3x/day, daily     Charges:  PT EVAL: Low Complexity, EX 1  In agreement with evaluation findings and clinical rationale, this evaluation involved LOW COMPLEXITY  decision making due to no personal factors/comorbidities, 1-2 body structures involved/activity limitations, and stable symptoms as documented in the evaluation.                                                          PLAN OF CARE:      Goals: (to be met in 8 visits)    Not Met Progress  Toward Partially  Met Met   Patient will improve QuickDASH by at least 10 points to demonstrate minimally clinically important difference for patient reported outcome measure for shoulder pain. [] [] [] []   Patient will demonstrate at least 30 degrees of left shoulder external rotation range of of motion to facilitate independence with ADLs and IADLs.  [] [] [] []   Patient will improve left shoulder external rotation strength by at least 1 MMT grade to facilitate independence with ADLs and IADLs.  [] [] [] []   Patient will demonstrate at least 120 degrees of left shoulder elevation range of motion to facilitate independence with overhead activities.  [] [] [] []       Frequency / Duration: Patient will be seen 1-2x/week or a total of 8  visits over a 90 day period. Treatment will include: Manual Therapy; Therapeutic Activities; Therapeutic Exercise; Neuromuscular Re-education; Home Exercise Program instruction; Electrical stimulation (attended); Patient/Family Education    Education or treatment limitation: None   Rehab Potential: good     QuickDASH Outcome Score  Score: (Patient-Rptd) 22.73 % (6/11/2025  4:17 PM)      Patient/Family/Caregiver was advised of these findings, precautions, and treatment options and has agreed to actively participate in planning and for this course of care.    Thank you for your referral. Please co-sign or sign and return this letter via fax as soon as possible to [phone]. If you have any questions, please contact me at Dept: [phone]    Sincerely,  Electronically signed by therapist: Jaclyn Meza PT  Physician's certification required: Yes  I certify the need for these services furnished  Additional Notes: Pt consent was obtained to proceed with the visit and recommended plan of care after discussion of all risks and benefits, including the risks of COVID 19 exposure under this plan of treatment and while under my care.    X___________________________________________________ Date____________________    Certification From: 6/11/2025  To: 9/9/2025               [1]   Current Outpatient Medications on File Prior to Visit   Medication Sig Dispense Refill    atorvastatin 20 MG Oral Tab Take 1 tablet (20 mg total) by mouth nightly. 90 tablet 1    lisinopril 10 MG Oral Tab Take 1 tablet (10 mg total) by mouth daily. 90 tablet 3    Ascorbic Acid (VITAMIN C OR) Take 1 tablet by mouth.         Current Facility-Administered Medications on File Prior to Visit   Medication Dose Route Frequency Provider Last Rate Last Admin    [COMPLETED] bupivacaine (Marcaine) 0.25% injection  4 mL Intrapleural Once Ozzy Guerra DO   10 mg at 04/29/25 0907    [COMPLETED] lidocaine (Xylocaine) 1 % injection  4 mL Intradermal Once Ozzy Guerra DO   4 mL at 04/29/25 0935    [COMPLETED] triamcinolone acetonide (Kenalog-40) 40 MG/ML injection 40 mg  40 mg Intramuscular Once Ozzy Guerra DO   40 mg at 04/29/25 0931    [COMPLETED] lidocaine (Xylocaine) 1 % injection  3 mL Intradermal Once Ozzy Guerra DO   3 mL at 04/29/25 0935      Render Risk Assessment In Note?: no Detail Level: Simple

## 2025-06-18 ENCOUNTER — OFFICE VISIT (OUTPATIENT)
Dept: PHYSICAL THERAPY | Age: 60
End: 2025-06-18
Attending: PHYSICAL MEDICINE & REHABILITATION
Payer: MEDICAID

## 2025-06-18 PROCEDURE — 97140 MANUAL THERAPY 1/> REGIONS: CPT

## 2025-06-18 PROCEDURE — 97110 THERAPEUTIC EXERCISES: CPT

## 2025-06-18 NOTE — PROGRESS NOTES
Patient: Daquan Henderson (59 year old, male) Referring Provider:  Insurance:   Diagnosis: Arthritis of left shoulder (M19.012)  Adhesive capsulitis of left shoulder (M75.02) Ozzy Guerra  [facility] MEDICAID   Date of Surgery: No data recorded Next MD visit:  N/A   Precautions:  None No data recorded Referral Information:    Date of Evaluation: Req: 9, Auth: 9, Exp: 8/10/2025    06/11/25 POC Auth Visits:          Today's Date   6/18/2025    Subjective  Patient states that he is feeling stiff today.       Pain: 0/10     Objective           6/11/2025 6/18/2025   Shoulder ROM/MMT   Rt Flexion Shoulder limited 130   Lt Flexion Shoulder limited 125   Rt Abduction Shoulder limited    Lt Abduction Shoulder limited    Rt IR Shoulder HBB limited    Lt IR Shoulder HBB limited    Rt IR Shoulder MMT 5/5    Lt IR Shoulder MMT 5/5    Rt ER Shoulder 30 deg, limited HBH 30 deg   Lt ER Shoulder 20 deg, limited HBH 15 deg   Rt ER Shoulder MMT 3+/5    Lt ER Shoulder MMT 4-/5         Assessment  Focused on sustained stretch of anterior capsule to improve shoulder external rotation. Patient with good tolerance to low load long duration stretch.    Goals (to be met in 8 visits)      Not Met Progress  Toward Partially  Met Met   Patient will improve QuickDASH by at least 10 points to demonstrate minimally clinically important difference for patient reported outcome measure for shoulder pain. [] [] [] []   Patient will demonstrate at least 30 degrees of left shoulder external rotation range of of motion to facilitate independence with ADLs and IADLs.  [] [] [] []   Patient will improve left shoulder external rotation strength by at least 1 MMT grade to facilitate independence with ADLs and IADLs.  [] [] [] []   Patient will demonstrate at least 120 degrees of left shoulder elevation range of motion to facilitate independence with overhead activities.  [] [] [] []           Plan  1-2x/week for 6 visits, passive physiological ER stretch  + AP glide, passive physiological flexion, may trial AP+inferior glide, cervical passive exam    Treatment Last 4 Visits  Treatment Day: 2       6/11/2025 6/18/2025   UE Treatment   Therapeutic Exercise - Pt education was provided on exam findings, treatment diagnosis, treatment plan, expectations, and prognosis.  - ER stretch with dowel - ROM (see objective)  - ER stretch with cane 5x45s, 15s rest between sets   - Shoulder flexion with cane 5x30s, 15s rest between sets     Manual Therapy  - Sustained ER passive physiological ROM 5x30s // 18 deg ER   - Sustained ER + GHJ AP glide 5x30s // 22 deg ER   *Increased time for rest and to re-assess between sets     Therapeutic Exercise Minutes 8 23   Manual Therapy Minutes  22   Evaluation Minutes 37    Total Time Of Timed Procedures 8 45   Total Time Of Service-Based Procedures 37 0   Total Treatment Time 45 45   HEP ER stretch with dowel 3x30s, 1-3x/day, daily  ER stretch with dowel 3x30s, 1-3x/day, daily, Flexion stretch with dowel 3x30s, 1-3x/day        HEP  ER stretch with dowel 3x30s, 1-3x/day, daily, Flexion stretch with dowel 3x30s, 1-3x/day    Charges  EX 2, MT 1

## 2025-06-23 ENCOUNTER — OFFICE VISIT (OUTPATIENT)
Dept: PHYSICAL THERAPY | Age: 60
End: 2025-06-23
Attending: PHYSICAL MEDICINE & REHABILITATION
Payer: MEDICAID

## 2025-06-23 PROCEDURE — 97140 MANUAL THERAPY 1/> REGIONS: CPT

## 2025-06-23 PROCEDURE — 97110 THERAPEUTIC EXERCISES: CPT

## 2025-06-23 NOTE — PROGRESS NOTES
Patient: Daquan Henderson (59 year old, male) Referring Provider:  Insurance:   Diagnosis: Arthritis of left shoulder (M19.012)  Adhesive capsulitis of left shoulder (M75.02) Ozzy Guerra  [facility] MEDICAID   Date of Surgery: No data recorded Next MD visit:  N/A   Precautions:  None No data recorded Referral Information:    Date of Evaluation: Req: 9, Auth: 9, Exp: 8/10/2025    06/11/25 POC Auth Visits:          Today's Date   6/23/2025    Subjective  Patient states that he felt that his shoulder felt more loose one day but then stiffness came back.       Pain: 4/10 (stiffness)     Objective       Cervical PAIVMs:  and R/L UPAs hypomobile          6/11/2025 6/18/2025 6/23/2025   Shoulder ROM/MMT   Rt Flexion Shoulder limited 130    Lt Flexion Shoulder limited 125 137   Rt Abduction Shoulder limited     Lt Abduction Shoulder limited     Rt IR Shoulder HBB limited     Lt IR Shoulder HBB limited     Rt IR Shoulder MMT 5/5     Lt IR Shoulder MMT 5/5     Rt ER Shoulder 30 deg, limited HBH 30 deg    Lt ER Shoulder 20 deg, limited HBH 15 deg 15 deg   Rt ER Shoulder MMT 3+/5     Lt ER Shoulder MMT 4-/5          Assessment  Patient demonstrating significant improvement in shoulder flexion active range of motion now able to achieve 137 degrees at baseline. Patient continues to remain limited with external rotation both between and within session.    Goals (to be met in 8 visits)      Not Met Progress  Toward Partially  Met Met   Patient will improve QuickDASH by at least 10 points to demonstrate minimally clinically important difference for patient reported outcome measure for shoulder pain. [] [] [] []   Patient will demonstrate at least 30 degrees of left shoulder external rotation range of of motion to facilitate independence with ADLs and IADLs.  [] [] [] []   Patient will improve left shoulder external rotation strength by at least 1 MMT grade to facilitate independence with ADLs and IADLs.  [] [] [] []    Patient will demonstrate at least 120 degrees of left shoulder elevation range of motion to facilitate independence with overhead activities.  [] [] [] []               Plan  1-2x/week for 5 visits, passive physiological ER stretch + AP glide versus AP glide, ER with TB    Treatment Last 4 Visits  Treatment Day: 3       6/11/2025 6/18/2025 6/23/2025   UE Treatment   Therapeutic Exercise - Pt education was provided on exam findings, treatment diagnosis, treatment plan, expectations, and prognosis.  - ER stretch with dowel - ROM (see objective)  - ER stretch with cane 5x45s, 15s rest between sets   - Shoulder flexion with cane 5x30s, 15s rest between sets   - ER stretch with dowel 3x45s  - Flexion stretch with dowel 3x45s   - R/L ER with RTB, trialed isometrics and reps, discontinued 2/2 form   *Time for rest between sets   Manual Therapy  - Sustained ER passive physiological ROM 5x30s // 18 deg ER   - Sustained ER + GHJ AP glide 5x30s // 22 deg ER   *Increased time for rest and to re-assess between sets   - Cervical PAIVMs (see objective)  - Sustained passive physiological ER at end range x30s, 2x45s, time for rest between sets // 20 deg ER AROM    Therapeutic Exercise Minutes 8 23 15   Manual Therapy Minutes  22 23   Evaluation Minutes 37     Total Time Of Timed Procedures 8 45 38   Total Time Of Service-Based Procedures 37 0 0   Total Treatment Time 45 45 38   HEP ER stretch with dowel 3x30s, 1-3x/day, daily  ER stretch with dowel 3x30s, 1-3x/day, daily, Flexion stretch with dowel 3x30s, 1-3x/day         HEP  ER stretch with dowel 3x30s, 1-3x/day, daily, Flexion stretch with dowel 3x30s, 1-3x/day    Charges  MT 2, EX 1

## 2025-06-30 ENCOUNTER — APPOINTMENT (OUTPATIENT)
Dept: PHYSICAL THERAPY | Age: 60
End: 2025-06-30
Attending: PHYSICAL MEDICINE & REHABILITATION
Payer: MEDICAID

## 2025-07-07 ENCOUNTER — OFFICE VISIT (OUTPATIENT)
Dept: PHYSICAL THERAPY | Age: 60
End: 2025-07-07
Attending: PHYSICAL MEDICINE & REHABILITATION
Payer: MEDICAID

## 2025-07-07 PROCEDURE — 97110 THERAPEUTIC EXERCISES: CPT

## 2025-07-07 PROCEDURE — 97140 MANUAL THERAPY 1/> REGIONS: CPT

## 2025-07-07 NOTE — PROGRESS NOTES
Patient: Daquan Henderson (59 year old, male) Referring Provider:  Insurance:   Diagnosis: Arthritis of left shoulder (M19.012)  Adhesive capsulitis of left shoulder (M75.02) Ozzy Charlie  [employer]   Date of Surgery: No data recorded Next MD visit:  N/A   Precautions:  None No data recorded Referral Information:    Date of Evaluation: Req: 9, Auth: 9, Exp: 8/10/2025    06/11/25 POC Auth Visits:          Today's Date   7/7/2025    Subjective  Patient states that the shoulder is slowly getting better. Still stiff though.       Pain: 3/10     Objective           6/18/2025 6/23/2025 7/7/2025   Shoulder ROM/MMT   Rt Flexion Shoulder 130     Lt Flexion Shoulder 125 137    Rt ER Shoulder 30 deg     Lt ER Shoulder 15 deg 15 deg 20 deg        Assessment  Trialed various manual therapy techniques to improve shoulder external range of motion but patient without significant change following either technique. Added shoulder external rotation with yellow band to facilitate strength and range of motion.    Goals (to be met in 8 visits)      Not Met Progress  Toward Partially  Met Met   Patient will improve QuickDASH by at least 10 points to demonstrate minimally clinically important difference for patient reported outcome measure for shoulder pain. [] [] [] []   Patient will demonstrate at least 30 degrees of left shoulder external rotation range of of motion to facilitate independence with ADLs and IADLs.  [] [] [] []   Patient will improve left shoulder external rotation strength by at least 1 MMT grade to facilitate independence with ADLs and IADLs.  [] [] [] []   Patient will demonstrate at least 120 degrees of left shoulder elevation range of motion to facilitate independence with overhead activities.  [] [] [] []                   Plan  1-2x/week for 4 visits, shoulder external rotation mobilization with movement?    Treatment Last 4 Visits  Treatment Day: 4       6/11/2025 6/18/2025 6/23/2025 7/7/2025   KELSY  Treatment   Therapeutic Exercise - Pt education was provided on exam findings, treatment diagnosis, treatment plan, expectations, and prognosis.  - ER stretch with dowel - ROM (see objective)  - ER stretch with cane 5x45s, 15s rest between sets   - Shoulder flexion with cane 5x30s, 15s rest between sets   - ER stretch with dowel 3x45s  - Flexion stretch with dowel 3x45s   - R/L ER with RTB, trialed isometrics and reps, discontinued 2/2 form   *Time for rest between sets - ROM (see objective)  - ER with YTB 3x10  - Shoulder flexion stretch 3x30s, 15s rest between sets  - Shoulder ER stretch 3x45s, 15s rest between sets       Manual Therapy  - Sustained ER passive physiological ROM 5x30s // 18 deg ER   - Sustained ER + GHJ AP glide 5x30s // 22 deg ER   *Increased time for rest and to re-assess between sets   - Cervical PAIVMs (see objective)  - Sustained passive physiological ER at end range x30s, 2x45s, time for rest between sets // 20 deg ER AROM  - AP glide in end range shoulder ER x3 rounds x3 min, gr I passive physiological shoulder flexion between sets // no change in shoulder ER   - Passive physiological shoulder ER gr III+ x5 min // no change in shoulder ER    Therapeutic Exercise Minutes 8 23 15 23   Manual Therapy Minutes  22 23 15   Evaluation Minutes 37      Total Time Of Timed Procedures 8 45 38 38   Total Time Of Service-Based Procedures 37 0 0 0   Total Treatment Time 45 45 38 38   HEP ER stretch with dowel 3x30s, 1-3x/day, daily  ER stretch with dowel 3x30s, 1-3x/day, daily, Flexion stretch with dowel 3x30s, 1-3x/day  - ER stretch with dowel 3x30s, 1-3x/day, daily  - Flexion stretch with dowel 3x30s, 1-3x/day  - ER with YTB 3x10, 1x/day         HEP  - ER stretch with dowel 3x30s, 1-3x/day, daily  - Flexion stretch with dowel 3x30s, 1-3x/day  - ER with YTB 3x10, 1x/day     Charges  EX 2, MT 1

## 2025-07-14 ENCOUNTER — OFFICE VISIT (OUTPATIENT)
Dept: PHYSICAL THERAPY | Age: 60
End: 2025-07-14
Attending: PHYSICAL MEDICINE & REHABILITATION
Payer: MEDICAID

## 2025-07-14 PROCEDURE — 97530 THERAPEUTIC ACTIVITIES: CPT

## 2025-07-14 PROCEDURE — 97112 NEUROMUSCULAR REEDUCATION: CPT

## 2025-07-14 PROCEDURE — 97110 THERAPEUTIC EXERCISES: CPT

## 2025-07-14 NOTE — PROGRESS NOTES
Patient: Daquan Henderson (59 year old, male) Referring Provider:  Insurance:   Diagnosis: Arthritis of left shoulder (M19.012)  Adhesive capsulitis of left shoulder (M75.02) Ozzy Guerra  [facility] MEDICAID   Date of Surgery: No data recorded Next MD visit:  N/A   Precautions:  None No data recorded Referral Information:    Date of Evaluation: Req: 9, Auth: 9, Exp: 8/10/2025    06/11/25 POC Auth Visits:          Today's Date   7/14/2025    Subjective  Patient states that the shoulder  is \"coming okay.\" Patient states that he forgets to do one of the exercises.       Pain: 4/10 (stiffness)     Objective           6/23/2025 7/7/2025 7/14/2025   Shoulder ROM/MMT   Rt Flexion Shoulder   138 deg   Lt Flexion Shoulder 137  130 deg   Rt IR Shoulder   HBB ipsilateral sacrum   Lt IR Shoulder   HBB ipsilateral sacrum   Rt ER Shoulder   35 deg   Lt ER Shoulder 15 deg 20 deg 25 deg        Assessment  Focused on stretching and and active assist range of motion exercise as patient with minimal changes following manual therapy in previous sessions. Patient with mild increase in stiffness and soreness with use of recumbent bike but overall good tolerance.    Goals (to be met in 8 visits)      Not Met Progress  Toward Partially  Met Met   Patient will improve QuickDASH by at least 10 points to demonstrate minimally clinically important difference for patient reported outcome measure for shoulder pain. [] [] [] []   Patient will demonstrate at least 30 degrees of left shoulder external rotation range of of motion to facilitate independence with ADLs and IADLs.  [] [] [] []   Patient will improve left shoulder external rotation strength by at least 1 MMT grade to facilitate independence with ADLs and IADLs.  [] [] [] []   Patient will demonstrate at least 120 degrees of left shoulder elevation range of motion to facilitate independence with overhead activities.  [] [] [] []                       Plan  1-2x/week for 3 visits,  review HEP, pulleys    Treatment Last 4 Visits  Treatment Day: 5       6/18/2025 6/23/2025 7/7/2025 7/14/2025   UE Treatment   Therapeutic Exercise - ROM (see objective)  - ER stretch with cane 5x45s, 15s rest between sets   - Shoulder flexion with cane 5x30s, 15s rest between sets   - ER stretch with dowel 3x45s  - Flexion stretch with dowel 3x45s   - R/L ER with RTB, trialed isometrics and reps, discontinued 2/2 form   *Time for rest between sets - ROM (see objective)  - ER with YTB 3x10  - Shoulder flexion stretch 3x30s, 15s rest between sets  - Shoulder ER stretch 3x45s, 15s rest between sets     - ROM (see objective)  - ER stretch 3x45s, 15s rest between sets  - Shoulder flexion stretch 3x45s, 15s rest between sets   - Recumbent bike x5 min      Neuro Re-Education    - ER with YTB 3x10  - Shoulder flexion pulleys 3x10   Manual Therapy - Sustained ER passive physiological ROM 5x30s // 18 deg ER   - Sustained ER + GHJ AP glide 5x30s // 22 deg ER   *Increased time for rest and to re-assess between sets   - Cervical PAIVMs (see objective)  - Sustained passive physiological ER at end range x30s, 2x45s, time for rest between sets // 20 deg ER AROM  - AP glide in end range shoulder ER x3 rounds x3 min, gr I passive physiological shoulder flexion between sets // no change in shoulder ER   - Passive physiological shoulder ER gr III+ x5 min // no change in shoulder ER     Therapeutic Activity    - HBB with strap x10  - Sleeper stretch L 3x30s,15s rest between sets  - L wall slide x6, discontinued 2/2 p*   Therapeutic Exercise Minutes 23 15 23 23   Neuro Re-Educ Minutes    8   Manual Therapy Minutes 22 23 15    Therapeutic Activity Minutes    8   Total Time Of Timed Procedures 45 38 38 39   Total Time Of Service-Based Procedures 0 0 0 0   Total Treatment Time 45 38 38 39   HEP ER stretch with dowel 3x30s, 1-3x/day, daily, Flexion stretch with dowel 3x30s, 1-3x/day  - ER stretch with dowel 3x30s, 1-3x/day, daily  - Flexion  stretch with dowel 3x30s, 1-3x/day  - ER with YTB 3x10, 1x/day  - Flexion stretch with dowel 3x45s, 1-3x/day  - ER with YTB 3x10, 1x/day   - Sleeper stretch 3x30s, 1x/day        HEP  - Flexion stretch with dowel 3x45s, 1-3x/day  - ER with YTB 3x10, 1x/day   - Sleeper stretch 3x30s, 1x/day    Charges  EX 2, NMRE 1, TA 1

## 2025-07-21 ENCOUNTER — APPOINTMENT (OUTPATIENT)
Dept: PHYSICAL THERAPY | Age: 60
End: 2025-07-21
Attending: PHYSICAL MEDICINE & REHABILITATION
Payer: MEDICAID

## 2025-07-28 ENCOUNTER — TELEPHONE (OUTPATIENT)
Dept: PHYSICAL THERAPY | Age: 60
End: 2025-07-28

## 2025-07-28 ENCOUNTER — APPOINTMENT (OUTPATIENT)
Dept: PHYSICAL THERAPY | Age: 60
End: 2025-07-28
Attending: PHYSICAL MEDICINE & REHABILITATION
Payer: MEDICAID

## 2025-08-08 ENCOUNTER — APPOINTMENT (OUTPATIENT)
Dept: PHYSICAL THERAPY | Age: 60
End: 2025-08-08
Attending: PHYSICAL MEDICINE & REHABILITATION

## (undated) DIAGNOSIS — Z12.11 COLON CANCER SCREENING: Primary | ICD-10-CM

## (undated) DEVICE — PAD,ABDOMINAL,8"X7.5",STERILE,LF,1/PK: Brand: MEDLINE

## (undated) DEVICE — ANCHR/SCREW SWIVELCK PEEK 4.75
Type: IMPLANTABLE DEVICE | Site: KNEE | Status: NON-FUNCTIONAL
Removed: 2023-02-21

## (undated) DEVICE — SUT VICRYL 2-0 FS-1 J443H

## (undated) DEVICE — SUT PDS II 0 CT-1 Z340H

## (undated) DEVICE — SUT PROLENE 3-0 PS-2 8687H

## (undated) DEVICE — SNARE CAPTIFLEX MICRO-OVL OLY

## (undated) DEVICE — SUT FIBERWIRE 5 CCS-1 AR-7211

## (undated) DEVICE — BRACE ORTH 30.5-IN 17-27IN

## (undated) DEVICE — KIT CLEAN ENDOKIT 1.1OZ GOWNX2

## (undated) DEVICE — BLADE SHVR COOLCUT 13CM 4MM

## (undated) DEVICE — NEEDLE SPINAL 18X3-1/2 PINK.

## (undated) DEVICE — GAMMEX® PI HYBRID SIZE 6.5, STERILE POWDER-FREE SURGICAL GLOVE, POLYISOPRENE AND NEOPRENE BLEND: Brand: GAMMEX

## (undated) DEVICE — DRAPE SRG 70X60IN SPLT U IMPRV

## (undated) DEVICE — SUCTION CANISTER, 3000CC,SAFELINER: Brand: DEROYAL

## (undated) DEVICE — 3M™ STERI-DRAPE™ U-DRAPE 1015: Brand: STERI-DRAPE™

## (undated) DEVICE — MEDI-VAC NON-CONDUCTIVE SUCTION TUBING 6MM X 1.8M (6FT.) L: Brand: CARDINAL HEALTH

## (undated) DEVICE — DISPOSABLE TOURNIQUET CUFF SINGLE BLADDER, DUAL PORT AND QUICK CONNECT CONNECTOR: Brand: COLOR CUFF

## (undated) DEVICE — Device: Brand: DUAL NARE NASAL CANNULAE FEMALE LUER CON 7FT O2 TUBE

## (undated) DEVICE — TUBING IRR 16FT CNT WV 3 ASCP

## (undated) DEVICE — PROXIMATE SKIN STAPLERS (35 WIDE) CONTAINS 35 STAINLESS STEEL STAPLES (FIXED HEAD): Brand: PROXIMATE

## (undated) DEVICE — SOLUTION  .9 3000ML

## (undated) DEVICE — SNARE OPTMZ PLPCTM TRP

## (undated) DEVICE — GAMMEX® PI HYBRID SIZE 8, STERILE POWDER-FREE SURGICAL GLOVE, POLYISOPRENE AND NEOPRENE BLEND: Brand: GAMMEX

## (undated) DEVICE — PROXIMATE RH ROTATING HEAD SKIN STAPLERS (35 WIDE) CONTAINS 35 STAINLESS STEEL STAPLES: Brand: PROXIMATE

## (undated) DEVICE — 60 ML SYRINGE REGULAR TIP: Brand: MONOJECT

## (undated) DEVICE — SOL NACL IRRIG 0.9% 1000ML BTL

## (undated) DEVICE — OCCLUSIVE GAUZE STRIP,3% BISMUTH TRIBROMOPHENATE IN PETROLATUM BLEND: Brand: XEROFORM

## (undated) DEVICE — GAUZE TRAY STERILE 4X4 12PLY

## (undated) DEVICE — CLOSURE EXOFIN 1.0ML

## (undated) DEVICE — COTTON UNDERCAST PADDING,REGULAR FINISH: Brand: WEBRIL

## (undated) DEVICE — APPLICATOR CHLORAPREP 26ML

## (undated) DEVICE — SUT ETHILON 3-0 FS-1 669H

## (undated) DEVICE — 20 ML SYRINGE LUER-LOCK TIP: Brand: MONOJECT

## (undated) DEVICE — KIT ENDO ORCAPOD 160/180/190

## (undated) DEVICE — ARTHROSCOPY: Brand: MEDLINE INDUSTRIES, INC.

## (undated) DEVICE — BANDAGE,GAUZE,BULKEE II,4.5"X4.1YD,STRL: Brand: MEDLINE

## (undated) DEVICE — 3M™ STERI-STRIP™ REINFORCED ADHESIVE SKIN CLOSURES, R1547, 1/2 IN X 4 IN (12 MM X 100 MM), 6 STRIPS/ENVELOPE: Brand: 3M™ STERI-STRIP™

## (undated) NOTE — LETTER
AUTHORIZATION FOR SURGICAL OPERATION OR OTHER PROCEDURE    1. I hereby authorize Dr. Ozzy Guerra and the [facility] staff assigned to my case to perform the following operation and/or procedure at the [facility]:    Left shoulder glenohumeral joint injection, ultrasound guidance, local anesthesia     2.  My physician has explained the nature and purpose of the operation or other procedure, possible alternative methods of treatment, the risks involved, and the possibility of complication to me.  I acknowledge that no guarantee has been made as to the result that may be obtained.  3.  I recognize that, during the course of this operation, or other procedure, unforseen conditions may necessitate additional or different procedure than those listed above.  I, therefore, further authorize and request that the above named physician, his/her physician assistants or designees perform such procedures as are, in his/her professional opinion, necessary and desirable.  4.  Any tissue or organs removed in the operation or other procedure may be disposed of by and at the discretion of the [facility] staff and [facility].  5.  I understand that in the event of a medical emergency, I will be transported by local paramedics to [facility] or other hospital emergency department.  6.  I certify that I have read and fully understand the above consent to operation and/or other procedure.    7.  I acknowledge that my physician has explained sedation/analgesia administration to me including the risks and benefits.  I consent to the administration of sedation/analgesia as may be necessary or desirable in the judgement of my physician.    Witness signature: ___________________________________________________ Date:  ______/______/_____                    Time:  ________ A.M.  P.M.       Patient Name:  Daquan Henderson  8/31/1965  [id number]         Patient signature:   ___________________________________________________      Statement of Physician  My signature below affirms that prior to the time of the procedure, I have explained to the patient and/or his/her guardian, the risks and benefits involved in the proposed treatment and any reasonable alternative to the proposed treatment.  I have also explained the risks and benefits involved in the refusal of the proposed treatment and have answered the patient's questions.                        Date:  ______/______/_______  Provider                      Signature:  __________________________________________________________       Time:  ___________ ARYAN HASSAN

## (undated) NOTE — LETTER
1/6/2024          To Whom It May Concern:    Daquan Henderson is currently under my medical care and may return to work at this time.  Activity is restricted as follows: no lifting over 50 lb. Restrictions until 04/01/2024.    If you require additional information please contact our office.        Sincerely,    Imtiaz Ocampo MD          Document generated by:  Imtiaz Ocampo MD

## (undated) NOTE — LETTER
5/28/2025          Daquan Henderson    [address] [place] [id number]         Dear Daquan,    Our records indicate that the tests ordered for you by Reynold Nguyen MD  have not been done.  If you have, in fact, already completed the tests or you do not wish to have the tests done, please contact our office at THE NUMBER LISTED BELOW.  Otherwise, please proceed with the testing.  Enclosed is a duplicate order for your convenience.  LABS    Actin (Smooth Muscle) Antibody  CBC With Differential With Platelet  Ferritin  Haptoglobin  Hepatic Function Panel (7)  Hepatitis A B + C profile  Immunoglobulin A/G/M, Quant  Iron And Tibc  Mitochondrial (M2) Antibody  Tissue Transglutaminase Ab, IgA      Sincerely,    Reynold Nguyen MD  [address]  [facility] [id number] [phone]

## (undated) NOTE — LETTER
[facility], [address] [facility], [place]  [address] [place] [address] Lorenzo Sailors [id number] [phone]                    6/23/22      To Whom It May Concern:      Anne Lucas is currently under my medical care and needs to continue with work restrictions including no lifting, pulling or pushing items greater than 25 lbs. He also should not squat, kneel or climb. If you require additional information please contact our office. Thank you.       Sincerely,    Aleksander Thakur MD          Document generated by:  Aleksander Thakur

## (undated) NOTE — LETTER
[address]  Authorization for Invasive Procedure                                                                                           1. I hereby authorize Marine Camacho MD, my physician and his/her assistants (if applicable), which may include medical students, residents, and/or fellows, to perform the following surgical operation/ procedure and administer such anesthesia as may be determined necessary by my physician: Operation/Procedure name (s) [address] [facility] on [address] [facility]   2. I recognize that during the surgical operation/procedure, unforeseen conditions may necessitate additional or different procedures than those listed above. I, therefore, further authorize and request that the above-named surgeon, assistants, or designees perform such procedures as are, in their judgment, necessary and desirable. 3.   My surgeon/physician has discussed prior to my surgery the potential benefits, risks and side effects of this procedure; the likelihood of achieving goals; and potential problems that might occur during recuperation. They also discussed reasonable alternatives to the procedure, including risks, benefits, and side effects related to the alternatives and risks related to not receiving this procedure. I have had all my questions answered and I acknowledge that no guarantee has been made as to the result that may be obtained. 4.   Should the need arise during my operation/procedure, which includes change of level of care prior to discharge, I also consent to the administration of blood and/or blood products. Further, I understand that despite careful testing and screening of blood or blood products by collecting agencies, I may still be subject to ill effects as a result of receiving a blood transfusion and/or blood products.   The following are some, but not all, of the potential risks that can occur: fever and allergic reactions, hemolytic reactions, transmission of diseases such as Hepatitis, AIDS and Cytomegalovirus (CMV) and fluid overload. In the event that I wish to have an autologous transfusion of my own blood, or a directed donor transfusion, I will discuss this with my physician. Check only if Refusing Blood or Blood Products  I understand refusal of blood or blood products as deemed necessary by my physician may have serious consequences to my condition to include possible death. I hereby assume responsibility for my refusal and release the hospital, its personnel, and my physicians from any responsibility for the consequences of my refusal.    o  Refuse   5. I authorize the use of any specimen, organs, tissues, body parts or foreign objects that may be removed from my body during the operation/procedure for diagnosis, research or teaching purposes and their subsequent disposal by hospital authorities. I also authorize the release of specimen test results and/or written reports to my treating physician on the hospital medical staff or other referring or consulting physicians involved in my care, at the discretion of the Pathologist or my treating physician. 6.   I consent to the photographing or videotaping of the operations or procedures to be performed, including appropriate portions of my body for medical, scientific, or educational purposes, provided my identity is not revealed by the pictures or by descriptive texts accompanying them. If the procedure has been photographed/videotaped, the surgeon will obtain the original picture, image, videotape or CD. The hospital will not be responsible for storage, release or maintenance of the picture, image, tape or CD.    7.   I consent to the presence of a  or observers in the operating room as deemed necessary by my physician or their designees.     8.   I recognize that in the event my procedure results in extended X-Ray/fluoroscopy time, I may develop a skin reaction. 9. If I have a Do Not Attempt Resuscitation (DNAR) order in place, that status will be suspended while in the operating room, procedural suite, and during the recovery period unless otherwise explicitly stated by me (or a person authorized to consent on my behalf). The surgeon or my attending physician will determine when the applicable recovery period ends for purposes of reinstating the DNAR order. 10. Patients having a sterilization procedure: I understand that if the procedure is successful the results will be permanent and it will therefore be impossible for me to inseminate, conceive, or bear children. I also understand that the procedure is intended to result in sterility, although the result has not been guaranteed. 11. I acknowledge that my physician has explained sedation/analgesia administration to me including the risk and benefits I consent to the administration of sedation/analgesia as may be necessary or desirable in the judgment of my physician. I CERTIFY THAT I HAVE READ AND FULLY UNDERSTAND THE ABOVE CONSENT TO OPERATION and/or OTHER PROCEDURE.     _________________________________________ _________________________________     ___________________________________  Signature of Patient     Signature of Responsible Person                   Printed Name of Responsible Person                              _________________________________________ ______________________________        ___________________________________  Signature of Witness         Date  Time         Relationship to Patient    STATEMENT OF PHYSICIAN My signature below affirms that prior to the time of the procedure; I have explained to the patient and/or his/her legal representative, the risks and benefits involved in the proposed treatment and any reasonable alternative to the proposed treatment.  I have also explained the risks and benefits involved in refusal of the proposed treatment and alternatives to the proposed treatment and have answered the patient's questions.  If I have a significant financial interest in a co-management agreement or a significant financial interest in any product or implant, or other significant relationship used in this procedure/surgery, I have disclosed this and had a discussion with my patient.     _______________________________________________________________ _____________________________  Sarbjit Colbert  )                                                                                         (Date)                                   (Time)  Patient Name: Papa Adams    : 1965   Printed: 2022      Medical Record #: [id number]                                              Page 1 of 1

## (undated) NOTE — LETTER
[address]  Authorization for Invasive Procedure                                                                                           1. I hereby authorize Kala Swenson MD, my physician and his/her assistants (if applicable), which may include medical students, residents, and/or fellows, to perform the following surgical operation/ procedure and administer such anesthesia as may be determined necessary by my physician: Operation/Procedure name (s) [address] [facility] on [address] [facility]   2. I recognize that during the surgical operation/procedure, unforeseen conditions may necessitate additional or different procedures than those listed above. I, therefore, further authorize and request that the above-named surgeon, assistants, or designees perform such procedures as are, in their judgment, necessary and desirable. 3.   My surgeon/physician has discussed prior to my surgery the potential benefits, risks and side effects of this procedure; the likelihood of achieving goals; and potential problems that might occur during recuperation. They also discussed reasonable alternatives to the procedure, including risks, benefits, and side effects related to the alternatives and risks related to not receiving this procedure. I have had all my questions answered and I acknowledge that no guarantee has been made as to the result that may be obtained. 4.   Should the need arise during my operation/procedure, which includes change of level of care prior to discharge, I also consent to the administration of blood and/or blood products. Further, I understand that despite careful testing and screening of blood or blood products by collecting agencies, I may still be subject to ill effects as a result of receiving a blood transfusion and/or blood products.   The following are some, but not all, of the potential risks that can occur: fever and allergic reactions, hemolytic reactions, transmission of diseases such as Hepatitis, AIDS and Cytomegalovirus (CMV) and fluid overload. In the event that I wish to have an autologous transfusion of my own blood, or a directed donor transfusion, I will discuss this with my physician. Check only if Refusing Blood or Blood Products  I understand refusal of blood or blood products as deemed necessary by my physician may have serious consequences to my condition to include possible death. I hereby assume responsibility for my refusal and release the hospital, its personnel, and my physicians from any responsibility for the consequences of my refusal.    o  Refuse   5. I authorize the use of any specimen, organs, tissues, body parts or foreign objects that may be removed from my body during the operation/procedure for diagnosis, research or teaching purposes and their subsequent disposal by hospital authorities. I also authorize the release of specimen test results and/or written reports to my treating physician on the hospital medical staff or other referring or consulting physicians involved in my care, at the discretion of the Pathologist or my treating physician. 6.   I consent to the photographing or videotaping of the operations or procedures to be performed, including appropriate portions of my body for medical, scientific, or educational purposes, provided my identity is not revealed by the pictures or by descriptive texts accompanying them. If the procedure has been photographed/videotaped, the surgeon will obtain the original picture, image, videotape or CD. The hospital will not be responsible for storage, release or maintenance of the picture, image, tape or CD.    7.   I consent to the presence of a  or observers in the operating room as deemed necessary by my physician or their designees.     8.   I recognize that in the event my procedure results in extended X-Ray/fluoroscopy time, I may develop a skin reaction. 9. If I have a Do Not Attempt Resuscitation (DNAR) order in place, that status will be suspended while in the operating room, procedural suite, and during the recovery period unless otherwise explicitly stated by me (or a person authorized to consent on my behalf). The surgeon or my attending physician will determine when the applicable recovery period ends for purposes of reinstating the DNAR order. 10. Patients having a sterilization procedure: I understand that if the procedure is successful the results will be permanent and it will therefore be impossible for me to inseminate, conceive, or bear children. I also understand that the procedure is intended to result in sterility, although the result has not been guaranteed. 11. I acknowledge that my physician has explained sedation/analgesia administration to me including the risk and benefits I consent to the administration of sedation/analgesia as may be necessary or desirable in the judgment of my physician. I CERTIFY THAT I HAVE READ AND FULLY UNDERSTAND THE ABOVE CONSENT TO OPERATION and/or OTHER PROCEDURE.     _________________________________________ _________________________________     ___________________________________  Signature of Patient     Signature of Responsible Person                   Printed Name of Responsible Person                              _________________________________________ ______________________________        ___________________________________  Signature of Witness         Date  Time         Relationship to Patient    STATEMENT OF PHYSICIAN My signature below affirms that prior to the time of the procedure; I have explained to the patient and/or his/her legal representative, the risks and benefits involved in the proposed treatment and any reasonable alternative to the proposed treatment.  I have also explained the risks and benefits involved in refusal of the proposed treatment and alternatives to the proposed treatment and have answered the patient's questions.  If I have a significant financial interest in a co-management agreement or a significant financial interest in any product or implant, or other significant relationship used in this procedure/surgery, I have disclosed this and had a discussion with my patient.     _______________________________________________________________ _____________________________  Hilary Luque Physician)                                                                                         (Date)                                   (Time)  Patient Name: Helena Yanez    : 1965   Printed: 4/3/2023      Medical Record #: [id number]                                              Page 1 of 1

## (undated) NOTE — LETTER
Date: 2025      Patient Name: Daquan Henderson      : 1965        Thank you for choosing [facility] as your health care provider. Your physician has deemed the following medical service(s) necessary. However, your insurance plan may not pay for all of your health care and costs and may deny payment for this service. The fact that your insurance plan does not pay for an item or service does not mean you should not receive it. The purpose of this form is to help you make an informed decision about whether or not you want to receive this service(s) that may not be paid for by your insurance plan.    CPT Code Description     Cost     Left shoulder glenohumeral joint injection, ultrasound guidance, local anesthesia       I understand that the above mentioned service(s) or supply may not be covered by my insurance company. I agree to be financially responsible for the cost of this service or supply in the event of my insurance denies payment as a non-covered benefit.        ______________________________________________________________________  Signature of Patient or Patient's Representative  Relationship  Date    ______________________________________________________________________  Signature of Witness to signing of form   Printed Name

## (undated) NOTE — LETTER
Dr. Antonio Li  [address] [place] [address], [place]    2023    [address] [facility]  Fax [phone]    Re: Rosa Elena Aleman # [id number]      To Whom It May Concern:    Mr. Anne Lucas ( 1965) is a patient under my medical care; He was referred to an Orthopedic Specialist (Coco Grover MD) and will now need surgery which is scheduled for 2023. Franco Livingston will be off work and unable to give a [id number] date until evaluated for post -surgical follow-up; He has this visit scheduled already and as of now will need to be excused from full work duties from 23 - 3/7/23. Any questions or concerns regarding time off, please contact our patient directly:  Anne Lucas  [address] [address]:  Coco Grover MD  [address] [facility], [address] [place] [address]  [phone]    As his primary care provider, we will also be here for a resource to our patient via [address] [place] [address] [address] or Phone [phone].      Thank you,         Mayito Garcia MD

## (undated) NOTE — LETTER
4/8/2025    Daquan Henderson  [address] [facility] [address]    Dear Daquan,    Our records indicate you did not show for your appointment on 04/07/2025.    To accommodate all our patients, we request at least 24 hours' notice if you need to cancel or reschedule your appointment.    If three appointments are missed within a 12-month period, we may begin the dismissal process for future care at [facility].    We value the relationship we have established with you.  We hope to continue to serve your health care needs.        [facility]